# Patient Record
Sex: FEMALE | Race: WHITE | NOT HISPANIC OR LATINO
[De-identification: names, ages, dates, MRNs, and addresses within clinical notes are randomized per-mention and may not be internally consistent; named-entity substitution may affect disease eponyms.]

---

## 2020-01-08 ENCOUNTER — TRANSCRIPTION ENCOUNTER (OUTPATIENT)
Age: 40
End: 2020-01-08

## 2020-01-12 ENCOUNTER — FORM ENCOUNTER (OUTPATIENT)
Age: 40
End: 2020-01-12

## 2020-01-13 ENCOUNTER — NON-APPOINTMENT (OUTPATIENT)
Age: 40
End: 2020-01-13

## 2020-01-13 ENCOUNTER — TRANSCRIPTION ENCOUNTER (OUTPATIENT)
Age: 40
End: 2020-01-13

## 2020-01-13 ENCOUNTER — APPOINTMENT (OUTPATIENT)
Dept: FAMILY MEDICINE | Facility: CLINIC | Age: 40
End: 2020-01-13
Payer: COMMERCIAL

## 2020-01-13 ENCOUNTER — APPOINTMENT (OUTPATIENT)
Dept: ULTRASOUND IMAGING | Facility: CLINIC | Age: 40
End: 2020-01-13
Payer: COMMERCIAL

## 2020-01-13 ENCOUNTER — OUTPATIENT (OUTPATIENT)
Dept: OUTPATIENT SERVICES | Facility: HOSPITAL | Age: 40
LOS: 1 days | End: 2020-01-13
Payer: COMMERCIAL

## 2020-01-13 VITALS
DIASTOLIC BLOOD PRESSURE: 62 MMHG | HEIGHT: 63 IN | HEART RATE: 71 BPM | OXYGEN SATURATION: 99 % | SYSTOLIC BLOOD PRESSURE: 108 MMHG | WEIGHT: 150 LBS | BODY MASS INDEX: 26.58 KG/M2

## 2020-01-13 DIAGNOSIS — R49.0 DYSPHONIA: ICD-10-CM

## 2020-01-13 DIAGNOSIS — Z82.49 FAMILY HISTORY OF ISCHEMIC HEART DISEASE AND OTHER DISEASES OF THE CIRCULATORY SYSTEM: ICD-10-CM

## 2020-01-13 DIAGNOSIS — R00.2 PALPITATIONS: ICD-10-CM

## 2020-01-13 DIAGNOSIS — E66.3 OVERWEIGHT: ICD-10-CM

## 2020-01-13 PROCEDURE — 76536 US EXAM OF HEAD AND NECK: CPT | Mod: 26

## 2020-01-13 PROCEDURE — 93000 ELECTROCARDIOGRAM COMPLETE: CPT | Mod: 59

## 2020-01-13 PROCEDURE — 36415 COLL VENOUS BLD VENIPUNCTURE: CPT

## 2020-01-13 PROCEDURE — 99204 OFFICE O/P NEW MOD 45 MIN: CPT | Mod: 25

## 2020-01-13 PROCEDURE — 76536 US EXAM OF HEAD AND NECK: CPT

## 2020-01-14 PROBLEM — R00.2 PALPITATIONS: Status: ACTIVE | Noted: 2020-01-13

## 2020-01-14 PROBLEM — Z82.49 FAMILY HISTORY OF HYPERTENSION: Status: ACTIVE | Noted: 2020-01-14

## 2020-01-14 PROBLEM — R49.0 DYSPHONIA: Status: ACTIVE | Noted: 2020-01-13

## 2020-01-14 LAB
ALBUMIN SERPL ELPH-MCNC: 4.3 G/DL
ALP BLD-CCNC: 50 U/L
ALT SERPL-CCNC: 12 U/L
ANION GAP SERPL CALC-SCNC: 13 MMOL/L
AST SERPL-CCNC: 18 U/L
BASOPHILS # BLD AUTO: 0.04 K/UL
BASOPHILS NFR BLD AUTO: 0.6 %
BILIRUB SERPL-MCNC: <0.2 MG/DL
BUN SERPL-MCNC: 7 MG/DL
CALCIUM SERPL-MCNC: 9.2 MG/DL
CHLORIDE SERPL-SCNC: 104 MMOL/L
CHOLEST SERPL-MCNC: 186 MG/DL
CHOLEST/HDLC SERPL: 4 RATIO
CO2 SERPL-SCNC: 24 MMOL/L
CREAT SERPL-MCNC: 0.5 MG/DL
EOSINOPHIL # BLD AUTO: 0.08 K/UL
EOSINOPHIL NFR BLD AUTO: 1.2 %
ESTIMATED AVERAGE GLUCOSE: 103 MG/DL
GLUCOSE SERPL-MCNC: 89 MG/DL
HBA1C MFR BLD HPLC: 5.2 %
HCT VFR BLD CALC: 44.9 %
HDLC SERPL-MCNC: 47 MG/DL
HGB BLD-MCNC: 13.6 G/DL
IMM GRANULOCYTES NFR BLD AUTO: 0.1 %
LDLC SERPL CALC-MCNC: 117 MG/DL
LYMPHOCYTES # BLD AUTO: 1.7 K/UL
LYMPHOCYTES NFR BLD AUTO: 24.5 %
MAN DIFF?: NORMAL
MCHC RBC-ENTMCNC: 28.6 PG
MCHC RBC-ENTMCNC: 30.3 GM/DL
MCV RBC AUTO: 94.3 FL
MONOCYTES # BLD AUTO: 0.33 K/UL
MONOCYTES NFR BLD AUTO: 4.8 %
NEUTROPHILS # BLD AUTO: 4.78 K/UL
NEUTROPHILS NFR BLD AUTO: 68.8 %
PLATELET # BLD AUTO: 265 K/UL
POTASSIUM SERPL-SCNC: 4.2 MMOL/L
PROT SERPL-MCNC: 6.9 G/DL
RBC # BLD: 4.76 M/UL
RBC # FLD: 13.1 %
SODIUM SERPL-SCNC: 141 MMOL/L
TRIGL SERPL-MCNC: 111 MG/DL
TSH SERPL-ACNC: 2.24 UIU/ML
WBC # FLD AUTO: 6.94 K/UL

## 2020-01-14 NOTE — PHYSICAL EXAM
[Normal Oropharynx] : the oropharynx was normal [Normal Outer Ear/Nose] : the outer ears and nose were normal in appearance [Supple] : supple [No Lymphadenopathy] : no lymphadenopathy [Normal S1, S2] : normal S1 and S2 [Normal Rate] : normal rate  [No Extremity Clubbing/Cyanosis] : no extremity clubbing/cyanosis [Soft] : abdomen soft [Non Tender] : non-tender [Non-distended] : non-distended [Normal] : no posterior cervical lymphadenopathy and no anterior cervical lymphadenopathy [No Joint Swelling] : no joint swelling [No Rash] : no rash [Coordination Grossly Intact] : coordination grossly intact [Normal Gait] : normal gait [Normal Affect] : the affect was normal [Alert and Oriented x3] : oriented to person, place, and time [Normal Insight/Judgement] : insight and judgment were intact [de-identified] : enlarged thyroid

## 2020-01-23 LAB
A PHAGOCYTOPH IGG TITR SER IF: NORMAL TITER
B BURGDOR AB SER QL IA: NEGATIVE
B MICROTI IGG TITR SER: NORMAL TITER
E CHAFFEENSIS IGG TITR SER IF: NORMAL TITER

## 2020-01-24 ENCOUNTER — TRANSCRIPTION ENCOUNTER (OUTPATIENT)
Age: 40
End: 2020-01-24

## 2020-03-05 ENCOUNTER — RESULT REVIEW (OUTPATIENT)
Age: 40
End: 2020-03-05

## 2020-10-26 ENCOUNTER — APPOINTMENT (OUTPATIENT)
Dept: OBGYN | Facility: CLINIC | Age: 40
End: 2020-10-26
Payer: COMMERCIAL

## 2020-10-26 VITALS
TEMPERATURE: 98.4 F | WEIGHT: 159 LBS | BODY MASS INDEX: 28.17 KG/M2 | SYSTOLIC BLOOD PRESSURE: 112 MMHG | HEIGHT: 63 IN | DIASTOLIC BLOOD PRESSURE: 70 MMHG

## 2020-10-26 DIAGNOSIS — Z82.49 FAMILY HISTORY OF ISCHEMIC HEART DISEASE AND OTHER DISEASES OF THE CIRCULATORY SYSTEM: ICD-10-CM

## 2020-10-26 DIAGNOSIS — Z80.3 FAMILY HISTORY OF MALIGNANT NEOPLASM OF BREAST: ICD-10-CM

## 2020-10-26 DIAGNOSIS — Z78.9 OTHER SPECIFIED HEALTH STATUS: ICD-10-CM

## 2020-10-26 PROCEDURE — 99203 OFFICE O/P NEW LOW 30 MIN: CPT | Mod: 25

## 2020-10-26 PROCEDURE — 99072 ADDL SUPL MATRL&STAF TM PHE: CPT

## 2020-10-27 PROBLEM — Z82.49 FAMILY HISTORY OF HYPERTENSION: Status: ACTIVE | Noted: 2020-10-26

## 2020-10-27 PROBLEM — Z78.9 PATIENT DENIES MEDICAL PROBLEMS: Status: RESOLVED | Noted: 2020-10-27 | Resolved: 2020-10-27

## 2020-10-27 PROBLEM — Z80.3 FAMILY HISTORY OF MALIGNANT NEOPLASM OF BREAST: Status: ACTIVE | Noted: 2020-10-26

## 2020-10-27 LAB
C TRACH RRNA SPEC QL NAA+PROBE: NOT DETECTED
HPV HIGH+LOW RISK DNA PNL CVX: NOT DETECTED
N GONORRHOEA RRNA SPEC QL NAA+PROBE: NOT DETECTED
SOURCE TP AMPLIFICATION: NORMAL

## 2020-10-27 NOTE — HISTORY OF PRESENT ILLNESS
[Patient reported mammogram was normal] : Patient reported mammogram was normal [Patient reported PAP Smear was normal] : Patient reported PAP Smear was normal [HIV test declined] : HIV test declined [Syphilis test declined] : Syphilis test declined [Hepatitis B test declined] : Hepatitis B test declined [Hepatitis C test declined] : Hepatitis C test declined [Mammogramdate] : 2020 [PapSmeardate] : 2018 [LMPDate] : 2018 [PGHxTotal] : 3 [[facility] : 2 [PG[facility]Premature] : 1 [[facility] [FreeTextEntry1] : 2009: , C/S, M 5lb. 2011: FT, C/S, M, 7lb. 2014: FT, C/S, F, 8lb. Denies hx of cysts, fibroids, abnormla pap, STI

## 2020-10-27 NOTE — HISTORY OF PRESENT ILLNESS
[Patient reported mammogram was normal] : Patient reported mammogram was normal [Patient reported PAP Smear was normal] : Patient reported PAP Smear was normal [HIV test declined] : HIV test declined [Syphilis test declined] : Syphilis test declined [Hepatitis B test declined] : Hepatitis B test declined [Hepatitis C test declined] : Hepatitis C test declined [Mammogramdate] : 2020 [PapSmeardate] : 2018 [LMPDate] : 2018 [PGHxTotal] : 3 [[facility]] : 2 [PGxPremature] : 1 [[facility]] : 3 [FreeTextEntry1] : 2009: , C/S, M 5lb. 2011: FT, C/S, M, 7lb. 2014: FT, C/S, F, 8lb. Denies hx of cysts, fibroids, abnormla pap, STI

## 2020-10-27 NOTE — PHYSICAL EXAM
[Appropriately responsive] : appropriately responsive [Alert] : alert [No Acute Distress] : no acute distress [No Lymphadenopathy] : no lymphadenopathy [Regular Rate Rhythm] : regular rate rhythm [No Murmurs] : no murmurs [Clear to Auscultation B/L] : clear to auscultation bilaterally [Soft] : soft [Non-distended] : non-distended [No HSM] : No HSM [No Lesions] : no lesions [No Mass] : no mass [Oriented x3] : oriented x3 [Examination Of The Breasts] : a normal appearance [No Discharge] : no discharge [No Masses] : no breast masses were palpable [Labia Majora] : normal [Labia Minora] : normal [Normal] : normal [Uterine Adnexae] : normal [FreeTextEntry7] : mildly tender on deep palpation RLQ, no rebound/guardind [FreeTextEntry5] : Pap collected, cervix present, no lesions [FreeTextEntry6] : uterus absent. Palpable right adnexal fullness, nonpalpable left adnexa. Mild right adnexal tenderness on deep palpation

## 2020-10-27 NOTE — DISCUSSION/SUMMARY
[FreeTextEntry1] : 39 yo with RLQ pain and found to have bilateral cysts. \par 1) Bilateral cysts: unclear right adnexal cyst is ovarian vs paratubal vs peritoneal inclusion cyst. Recommend MRI for further evaluation\par \par Discussed NSAIDs prn\par \par 2) Health maintenance: \par Pap + HPV\par GC/CT\par Declines STI testing \par \par Return after MRI to discuss results and management plan

## 2020-10-27 NOTE — DISCUSSION/SUMMARY
[FreeTextEntry1] : 41 yo with RLQ pain and found to have bilateral cysts. \par 1) Bilateral cysts: unclear right adnexal cyst is ovarian vs paratubal vs peritoneal inclusion cyst. Recommend MRI for further evaluation\par \par Discussed NSAIDs prn\par \par 2) Health maintenance: \par Pap + HPV\par GC/CT\par Declines STI testing \par \par Return after MRI to discuss results and management plan

## 2020-11-03 ENCOUNTER — APPOINTMENT (OUTPATIENT)
Dept: MRI IMAGING | Facility: CLINIC | Age: 40
End: 2020-11-03
Payer: COMMERCIAL

## 2020-11-03 ENCOUNTER — OUTPATIENT (OUTPATIENT)
Dept: OUTPATIENT SERVICES | Facility: HOSPITAL | Age: 40
LOS: 1 days | End: 2020-11-03
Payer: COMMERCIAL

## 2020-11-03 DIAGNOSIS — Z00.8 ENCOUNTER FOR OTHER GENERAL EXAMINATION: ICD-10-CM

## 2020-11-03 DIAGNOSIS — N83.291 OTHER OVARIAN CYST, RIGHT SIDE: ICD-10-CM

## 2020-11-03 LAB — CYTOLOGY CVX/VAG DOC THIN PREP: NORMAL

## 2020-11-03 PROCEDURE — 72197 MRI PELVIS W/O & W/DYE: CPT

## 2020-11-03 PROCEDURE — 72197 MRI PELVIS W/O & W/DYE: CPT | Mod: 26

## 2020-11-03 PROCEDURE — A9585: CPT

## 2020-11-18 ENCOUNTER — APPOINTMENT (OUTPATIENT)
Dept: OBGYN | Facility: CLINIC | Age: 40
End: 2020-11-18
Payer: COMMERCIAL

## 2020-11-18 VITALS
SYSTOLIC BLOOD PRESSURE: 122 MMHG | BODY MASS INDEX: 28.59 KG/M2 | DIASTOLIC BLOOD PRESSURE: 74 MMHG | WEIGHT: 161.38 LBS | HEIGHT: 63 IN | TEMPERATURE: 98.5 F

## 2020-11-18 PROCEDURE — 99213 OFFICE O/P EST LOW 20 MIN: CPT | Mod: 25

## 2020-11-20 NOTE — HISTORY OF PRESENT ILLNESS
[FreeTextEntry1] : Pt here today for follow-up of MRI results and review of previous operative report. She reports pain has gradually improved but still occasionally present but not sharp pain that initially brought her to ED. Her pain completely resolves with Tylenol. \par \par MRI 11/3/20: \par 1.7cm simple appearing right ovarian cystic lesion, adjacent to right ovary is a tubular cystic lesion measuring 1.8cm without internal septation or enhancing mural nodule, no pelvic lymphadenopathy. Recommend continued ultrasound surveillance with repeat study in 3 months for stability or resolution.\par Addendum: tubular right adnexal cystic lesion measures 4.2 cm in longest dimension. \par \par Operative report reviewed from surgery 2018: Procedure: laparoscopic supracervical hysterectomy, bilateral salpingectomy, lysis of bowel adhesions. Notable findings of omental and bowel adhesions to anterior abdominal wall, uterus densely adherent to anterior abdominal wall and cervix unable to be visualized until lysis of adhesions was performed.

## 2020-11-20 NOTE — PLAN
[FreeTextEntry1] : 41 yo with right ovarian cyst and additional cystic structure likely peritoneal inclusion cyst. \par -Given her continued improvement, I recommend expectant management at this time. We discussed her severe adhesive disease and risks of surgery that outweight the benefits at this point since she is improving. Discussed that this additional cyst appears separate from ovary and is likely a peritoneal inclusion cyst. Unlikely to be hydrosalpinx given her tubes were removed\par -Return in 2-3 months for repeat sonogram in office \par -Tylenol or NSAIDs prn pain, given instructions to call office or go to ED if pain is severe\par -If her symptoms worsen, consider surgical intervention

## 2021-01-20 ENCOUNTER — APPOINTMENT (OUTPATIENT)
Dept: OBGYN | Facility: CLINIC | Age: 41
End: 2021-01-20
Payer: COMMERCIAL

## 2021-01-20 VITALS
BODY MASS INDEX: 28.9 KG/M2 | WEIGHT: 163.13 LBS | TEMPERATURE: 97.2 F | SYSTOLIC BLOOD PRESSURE: 118 MMHG | HEIGHT: 63 IN | DIASTOLIC BLOOD PRESSURE: 70 MMHG

## 2021-01-20 PROCEDURE — 76830 TRANSVAGINAL US NON-OB: CPT

## 2021-01-20 PROCEDURE — 99072 ADDL SUPL MATRL&STAF TM PHE: CPT

## 2021-01-20 PROCEDURE — 99213 OFFICE O/P EST LOW 20 MIN: CPT | Mod: 25

## 2021-01-20 NOTE — PHYSICAL EXAM
[Examination Of The Breasts] : a normal appearance [Normal] : normal [No Discharge] : no discharge [No Masses] : no breast masses were palpable

## 2021-01-20 NOTE — DISCUSSION/SUMMARY
[FreeTextEntry1] : 39 yo with bilateral lower pelvic pain R>L with persistent right adnexal cyst and left ovarian cyst. Today pt with reported left galactorrhea, no masses palpated. \par 1) Pelvic pain: \par Discussed options for management including Tylenol/NSAIDs prn, hormonal management, or surgical intervention. Discussed she is a candidate for minimally invasive approach, recommend robotic approach. Discussed risk of laparotomy given prior surgery indicated significant adhesions. She wants to review her options with her  before making a decision. \par \par 2) Galactorrhea: not produced on exam today, no masses palpated\par Mammogram and ultrasound ordered\par Consider prolactin at last visit ( may be falsely elevated today given breast exam)\par \par Return 1-2 weeks for followup

## 2021-01-20 NOTE — HISTORY OF PRESENT ILLNESS
[FreeTextEntry1] : 41 yo with hx of RLQ pain and adnexal cyst returns for follow-up today with repeat ultrasound. Pt reports continued intermittent RLQ pain, occasionally unable to walk due to pain. Occasional LLQ pain. \par \par States she recently noticed milky discharge from left breast in shower during expression. Denies any palpable masses. She has a screening mammogram scheduled for March. Also reports headache x last 3 days, improving with Tylenol. Denies visual changes.

## 2021-01-20 NOTE — PROCEDURE
[Pelvic Pain] : pelvic pain [F/U Abnormal US] : f/u abnormal ultrasound [Transvaginal Ultrasound] : transvaginal ultrasound [Absent] : absent [FreeTextEntry5] : Cervix present measuring 4.1x4.2cm, no free fluid [FreeTextEntry7] : Right ovary not visualized, in the area of the right adnexa a tubular structure is noted measuring 4.4x2.1cm hypoechoic [FreeTextEntry8] : 3.5x2.4cm with simple LOV cyst measuring 2x2cm  [FreeTextEntry4] : Persistent right adnexal cyst: hydrosalpinx vs ovarian cyst vs peritoneal inclusion cyst\par Simple left ovarian cyst

## 2021-02-01 ENCOUNTER — APPOINTMENT (OUTPATIENT)
Dept: OBGYN | Facility: CLINIC | Age: 41
End: 2021-02-01
Payer: COMMERCIAL

## 2021-02-01 PROCEDURE — 99214 OFFICE O/P EST MOD 30 MIN: CPT | Mod: 95

## 2021-02-01 NOTE — DISCUSSION/SUMMARY
[FreeTextEntry1] : 39 yo with pelvic pain R>L and bilateral adnexal cysts. \par -Options for management dicsussed, pt would like to try OCP. No contraindications, instructed on use. Rx sent to pharmacy\par -Umbilical hernia: currently asymptomatic, will need referral to general surgeon at next visit\par -Breast galactorrhea: await imaging reports, currently resolved. consider PRL at next visit\par -Return in 3 months for follow-up and repeat ultrasound\par

## 2021-02-01 NOTE — HISTORY OF PRESENT ILLNESS
[FreeTextEntry1] : 39 yo here for televist today as follow-up to her symptoms. No change in symptoms of abdominal pain since last visit. States breast discharge has resolved. Had mammogram/ultrasound done which she states was normal, report not yet available. \par \par We reviewed her imaging findings of bilateral adnexal cysts. Discussed right adnexal cyst with possible etiology of ovarian cyst, hydrosalpinx (however prior op report with bilateral salpingectomy confirmed on pathology), peritoneal inclusion cyst. Left adnexal cyst appears to be associated with the left ovary. Discussed options of NSAIDs/Tylenol prn, hormonal management, surgical intervention. Pt would like to try hormonal management to assess for improvement prior to scheduling surgery in the summer as she cannot take time from work at this point. We reviewed how OCP work to prevent ovulation and the growth of follicular cysts. We reviewed that it may not change her current cysts and that she may ultimately need surgery. Again discussed risks of surgery and risk of laparotomy with prior surgery that indicated significant adhesions but that she is a candidate for robotic approach. \par \par We discussed that she also has a small umbilical hernia on imaging and will need a referral to surgeon for evaluation. She would like to wait for this as she does not have pain in her umbilicus often and due to her work schedule.

## 2021-02-01 NOTE — REASON FOR VISIT
[Home] : at home, [unfilled] , at the time of the visit. [Other Location: e.g. Home (Enter Location, City,State)___] : at [unfilled] [Verbal consent obtained from patient] : the patient, [unfilled] [Follow-Up] : a follow-up evaluation of [FreeTextEntry2] : RLQ pain

## 2021-05-03 ENCOUNTER — APPOINTMENT (OUTPATIENT)
Dept: OBGYN | Facility: CLINIC | Age: 41
End: 2021-05-03
Payer: COMMERCIAL

## 2021-05-03 VITALS
WEIGHT: 162 LBS | BODY MASS INDEX: 28.7 KG/M2 | DIASTOLIC BLOOD PRESSURE: 62 MMHG | HEIGHT: 63 IN | SYSTOLIC BLOOD PRESSURE: 128 MMHG

## 2021-05-03 PROCEDURE — 99213 OFFICE O/P EST LOW 20 MIN: CPT | Mod: 25

## 2021-05-03 PROCEDURE — 76830 TRANSVAGINAL US NON-OB: CPT

## 2021-05-03 PROCEDURE — 99072 ADDL SUPL MATRL&STAF TM PHE: CPT

## 2021-05-03 NOTE — PROCEDURE
[Pelvic Pain] : pelvic pain [Absent] : absent [FreeTextEntry9] : follow-up cyst [FreeTextEntry7] : 3.0x1.6cm with ROV simple cyst 1.4x1.4cm,  paraovarian cystic structure 3.5x2.0 with single thin septation [FreeTextEntry8] : 2.3x1.3, no cysts visualized [FreeTextEntry4] : Small simple ROV cyst, paraovarian cystic structure slightly smaller than previous\par Left ovarian cyst resolved

## 2021-05-03 NOTE — HISTORY OF PRESENT ILLNESS
[FreeTextEntry1] : Pt here for follow-up. She tried OCP x 3 weeks but self-discontinued given persistent nausea that was not resolving. She states pain has been improving in frequency and severity. States she notices some discomfort weekly that is dull, resolves spontaneously or with 500mg Tylenol. Denies any other symptoms. \par \par States intermittent discomfort in umbilical area, hx of umbilical hernia.

## 2021-05-03 NOTE — DISCUSSION/SUMMARY
[FreeTextEntry1] : 39 yo with pelvic pain improving and right paraovarian cyst decreasing in size, left ovarian cyst resolved, new small simple ROV cyst. \par -Findings discussed with pt. Discussed ovarian cysts appear functional and may continue to intermittently come and go and do not need follow-up as long as her pain continues to improve.\par -Paraovarian cyst may be remnant hydrosalpinx vs paraovarian cyst vs peritoneal inclusion cyst. Given it is decreasing in size and pain is improving, recommend continued expectant management\par -Referral given for consultation for umbilical hernia

## 2022-02-28 ENCOUNTER — APPOINTMENT (OUTPATIENT)
Dept: OBGYN | Facility: CLINIC | Age: 42
End: 2022-02-28
Payer: COMMERCIAL

## 2022-02-28 VITALS
BODY MASS INDEX: 27.82 KG/M2 | SYSTOLIC BLOOD PRESSURE: 120 MMHG | WEIGHT: 157 LBS | HEIGHT: 63 IN | TEMPERATURE: 97.8 F | DIASTOLIC BLOOD PRESSURE: 60 MMHG

## 2022-02-28 PROCEDURE — 99213 OFFICE O/P EST LOW 20 MIN: CPT

## 2022-02-28 NOTE — PHYSICAL EXAM
[Chaperone Present] : A chaperone was present in the examining room during all aspects of the physical examination [FreeTextEntry1] : FLORIAN Sinha  [Appropriately responsive] : appropriately responsive [Alert] : alert [No Acute Distress] : no acute distress [No Lymphadenopathy] : no lymphadenopathy [Regular Rate Rhythm] : regular rate rhythm [No Murmurs] : no murmurs [Clear to Auscultation B/L] : clear to auscultation bilaterally [Soft] : soft [Non-distended] : non-distended [No HSM] : No HSM [No Lesions] : no lesions [No Mass] : no mass [Oriented x3] : oriented x3 [FreeTextEntry7] : mildly tender on deep palpation RLQ, no rebound/guarding [Labia Majora] : normal [Labia Minora] : normal [Normal] : normal [Uterine Adnexae] : normal [FreeTextEntry5] : cervix present, no lesions [FreeTextEntry6] : uterus absent. Palpable right adnexal fullness, nonpalpable left adnexa. Mild right adnexal tenderness on deep palpation

## 2022-02-28 NOTE — HISTORY OF PRESENT ILLNESS
[FreeTextEntry1] : Pt here for follow-up. She continues to have pelvic pressure and intermittent pain. She takes 1000mg Tylenol about once per month. No other complaints. \par \par Prior MRI 11/2020 with 1.7cm ROV cyst, tubular cystic structure right anexa 4.2x1.8cm.

## 2022-02-28 NOTE — DISCUSSION/SUMMARY
[FreeTextEntry1] : 40 yo with pelvic pain improving and prior right paraovarian cyst decreasing in size and new small simple ROV cyst. \par -Repeat pelvic ultrasound for stability\par -Continue NSAID or Tylenol prn\par -Pt wishing to avoid surgical intervention\par -Return after ultrasound to review results

## 2022-03-14 ENCOUNTER — APPOINTMENT (OUTPATIENT)
Dept: OBGYN | Facility: CLINIC | Age: 42
End: 2022-03-14
Payer: COMMERCIAL

## 2022-03-14 VITALS
BODY MASS INDEX: 27.82 KG/M2 | WEIGHT: 157 LBS | HEIGHT: 63 IN | DIASTOLIC BLOOD PRESSURE: 62 MMHG | TEMPERATURE: 97.6 F | SYSTOLIC BLOOD PRESSURE: 110 MMHG

## 2022-03-14 DIAGNOSIS — K42.9 UMBILICAL HERNIA W/OUT OBSTRUCTION OR GANGRENE: ICD-10-CM

## 2022-03-14 PROCEDURE — 99213 OFFICE O/P EST LOW 20 MIN: CPT

## 2022-03-14 NOTE — DISCUSSION/SUMMARY
[FreeTextEntry1] : 40 yo with pelvic pain improving and prior right paraovarian cyst stable in size. \par -Continue NSAID or Tylenol prn\par -Pt wishing to avoid surgical intervention at this time\par -Referral to Surgery for umbilical hernia\par -Follow up in 6 months

## 2022-03-14 NOTE — HISTORY OF PRESENT ILLNESS
[FreeTextEntry1] : Pt here for follow-up. \par \par Pelvic ultrasound with ovoid cystic focus ROV 4.0x1.7x1.6cm, LOV with small follicle seen measuring up to 1.7cm. \par \par Findings reviewed, discussed stable size and no changes. \par \par She continues to have intermittent pain, gets relief with Tylenol. \par \par Discussed options of continued pain management vs surgical intervention. She would like to continue with pain management as needed. \par \par Plan for referral to surgery for umbilical hernia on MRI. Discussed that she should have this address during her surgery if she proceeds with surgery in the future.

## 2022-04-14 ENCOUNTER — RESULT REVIEW (OUTPATIENT)
Age: 42
End: 2022-04-14

## 2022-04-21 ENCOUNTER — APPOINTMENT (OUTPATIENT)
Dept: BREAST CENTER | Facility: CLINIC | Age: 42
End: 2022-04-21
Payer: COMMERCIAL

## 2022-04-21 VITALS
HEART RATE: 87 BPM | WEIGHT: 160 LBS | HEIGHT: 63 IN | SYSTOLIC BLOOD PRESSURE: 145 MMHG | DIASTOLIC BLOOD PRESSURE: 81 MMHG | BODY MASS INDEX: 28.35 KG/M2 | TEMPERATURE: 97.4 F | OXYGEN SATURATION: 98 %

## 2022-04-21 DIAGNOSIS — N63.20 UNSPECIFIED LUMP IN THE LEFT BREAST, UNSPECIFIED QUADRANT: ICD-10-CM

## 2022-04-21 DIAGNOSIS — N64.52 NIPPLE DISCHARGE: ICD-10-CM

## 2022-04-21 PROCEDURE — 99204 OFFICE O/P NEW MOD 45 MIN: CPT

## 2022-04-22 PROBLEM — N63.20 BREAST MASS, LEFT: Status: ACTIVE | Noted: 2022-04-22

## 2022-04-22 PROBLEM — N64.52 NIPPLE DISCHARGE: Status: ACTIVE | Noted: 2022-04-22

## 2022-04-22 NOTE — HISTORY OF PRESENT ILLNESS
[FreeTextEntry1] : I had the pleasure of seeing KIRSTIN PERSAUD  in the office today for a new breast evaluation.\par \par Kirstin presents today for possible surgical consultation due to increase in size (1.2 cm --> 1.6 cm) of benign and concordant mass from 2020.  She understands she does not need to undergo surgery at this time and will continue to monitor.  \par \par She denies dominant breast mass, skin changes or nipple discharge. She denies headaches, blurry vision, chest pain, SOB, abdominal pain, joint aches or difficult walking.\par \par  with 1st delivery at 29.  Menses began at age 12.\par She denies personal history of malignancy.\par Family history is significant for paternal aunt diagnosed with breast cancer at age 50.\par \par Mar 5 2020 PROCEDURE: WOO 0101 _ LT NEEDLE BX ULT [id number] PRO\par FULL RESULT:   ******** ADDENDUM #1 ********\par Pathology from needle biopsy is as follows:\par 1.)Left breast 12-1:00 axis: Benign breast parenchyma with adenosis, embedded in a hyalinized and sclerotic fibrotic stroma. 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 Recommend follow up bilateral mammogram and sonogram in one year, unless clinically indicated sooner. We will telephone her with these results and recommendations.\par \par 2022  Mammogram/sonogram\par Impression: No suspicious mammographic findings. Heterogeneously dense breast tissue, for which correlation with breast ultrasound is recommended. Please refer to today's bilateral breast ultrasound demonstrating interval enlargement in previously biopsied mass in the retroareolar left breast and a new 1.1 cm mass in the left breast 9:00 axis, for which ultrasound-guided core biopsy is recommended. Additionally, given the interval enlargement in previously biopsied benign mass retroareolar left breast, breast surgical consultation recommended.\par The patient is not yet aware of these findings. We can perform the core biopsy at our facility. We will contact her and assist her with obtaining the necessary follow-up appointments.\par BI-RADS 4: Suspicious Dense\par \par 2022 WOO 0101 _ LT NEEDLE BX ULT [id number] PRO\par FULL RESULT: ******** ADDENDUM #1 ********\par Pathology from the left breast mass 9:00 position demonstrated fibrocystic change with focal sclerosing adenosis. Findings concordant with imaging features. Six-month left breast ultrasonographic follow recommended.\par Findings and recommendations be communicated by telephone.\par \par Kirstin presents today for possible surgical consultation due to increase in size (1.2 cm --> 1.6 cm) of benign and concordant mass from 2020.  She understands she does not need to undergo surgery at this time and will continue to monitor.  She was so happy that she does not need to undergo surgery at this time and began crying due to the multiple surgeries she has had in the patients.  We also reviewed her most recent biopsy from 2022 and she understands results are benign and concordant with recommendation of US in 6 months.  She will follow up afterwards to discuss.  She also reports one episode of left nipple yellow discharge and will be sent for MR breast. She will return to the office in 6 months if imaging is stable and benign.\par \par She understands and agrees with plan.  All questions answered.\par

## 2022-04-22 NOTE — ASSESSMENT
[FreeTextEntry1] : Kirstin presents today for possible surgical consultation due to increase in size (1.2 cm --> 1.6 cm) of benign and concordant mass from March 2020.  She understands she does not need to undergo surgery at this time and will continue to monitor.  She was so happy that she does not need to undergo surgery at this time and began crying due to the multiple surgeries she has had in the patients.  We also reviewed her most recent biopsy from 4/14/2022 and she understands results are benign and concordant with recommendation of US in 6 months.  She will follow up afterwards to discuss.\par 1.  follow up in 6 months\par 2.  Left breast US due in October 2022\par 3.  MR breast- 1 episode of left yellow nipple discharge

## 2022-04-22 NOTE — PHYSICAL EXAM
[Normocephalic] : normocephalic [Atraumatic] : atraumatic [Supple] : supple [No Supraclavicular Adenopathy] : no supraclavicular adenopathy [Examined in the supine and seated position] : examined in the supine and seated position [Bra Size: ___] : Bra Size: [unfilled] [No dominant masses] : no dominant masses in right breast  [No dominant masses] : no dominant masses left breast [No Nipple Retraction] : no left nipple retraction [No Nipple Discharge] : no left nipple discharge [Breast Nipple Inversion] : nipples not inverted [Breast Nipple Retraction] : nipples not retracted [Breast Nipple Flattening] : nipples not flattened [Breast Nipple Fissures] : nipples not fissured [Breast Abnormal Lactation (Galactorrhea)] : no galactorrhea [Breast Abnormal Secretion Bloody Fluid] : no bloody discharge [Breast Abnormal Secretion Serous Fluid] : no serous discharge [Breast Abnormal Secretion Opalescent Fluid] : no milky discharge [No Axillary Lymphadenopathy] : no left axillary lymphadenopathy [No Edema] : no edema [No Rashes] : no rashes [No Ulceration] : no ulceration [de-identified] : No supraclavicular or axillary adenopathy. No dominant masses, normal to palpation. Everted nipple without discharge. No skin changes.\par \par  [de-identified] : No supraclavicular or axillary adenopathy. No dominant masses, normal to palpation. Everted nipple without discharge. No skin changes.\par \par

## 2022-09-13 ENCOUNTER — APPOINTMENT (OUTPATIENT)
Dept: OBGYN | Facility: CLINIC | Age: 42
End: 2022-09-13

## 2022-10-03 ENCOUNTER — APPOINTMENT (OUTPATIENT)
Dept: OBGYN | Facility: CLINIC | Age: 42
End: 2022-10-03

## 2022-10-14 ENCOUNTER — RESULT REVIEW (OUTPATIENT)
Age: 42
End: 2022-10-14

## 2022-11-03 ENCOUNTER — APPOINTMENT (OUTPATIENT)
Dept: UROLOGY | Facility: CLINIC | Age: 42
End: 2022-11-03

## 2022-11-03 VITALS
SYSTOLIC BLOOD PRESSURE: 148 MMHG | HEART RATE: 71 BPM | WEIGHT: 157 LBS | HEIGHT: 63 IN | DIASTOLIC BLOOD PRESSURE: 88 MMHG | BODY MASS INDEX: 27.82 KG/M2

## 2022-11-03 DIAGNOSIS — N39.0 URINARY TRACT INFECTION, SITE NOT SPECIFIED: ICD-10-CM

## 2022-11-03 PROCEDURE — 99204 OFFICE O/P NEW MOD 45 MIN: CPT

## 2022-11-03 NOTE — PHYSICAL EXAM
[General Appearance - Well Developed] : well developed [General Appearance - Well Nourished] : well nourished [Normal Appearance] : normal appearance [Well Groomed] : well groomed [General Appearance - In No Acute Distress] : no acute distress [Edema] : no peripheral edema [Respiration, Rhythm And Depth] : normal respiratory rhythm and effort [Exaggerated Use Of Accessory Muscles For Inspiration] : no accessory muscle use [Abdomen Soft] : soft [Abdomen Tenderness] : non-tender [Normal Station and Gait] : the gait and station were normal for the patient's age [] : no rash [No Focal Deficits] : no focal deficits [Oriented To Time, Place, And Person] : oriented to person, place, and time [Affect] : the affect was normal [Mood] : the mood was normal [Not Anxious] : not anxious [FreeTextEntry1] : +left cva tenderness

## 2022-11-03 NOTE — HISTORY OF PRESENT ILLNESS
[FreeTextEntry1] : 41yo F presents after hospital f/u\par She went to ED few days ago with lower left abdominal pain and dysuria. Diagnosed with UTI and currently on cefdinir x 2 week course for ascending uti. \par CT performed showed incidental right renal mass ~2.8 x 2.3 cm concerning for renal cell carcinoma vs angiomyolipoma. \par She still has slight lower abdominal pain. Dysuria resolved\par \par no smoking hx

## 2022-11-03 NOTE — ASSESSMENT
[FreeTextEntry1] : Renal mass:\par will obtain MRI renal mass protocol and f/u\par CT images personally reviewed with patient and answered all questions \par \par Ascending UTI:\par c/w 2 week course of cefdinir \par f/u in 2 weeks recheck urine

## 2022-11-10 ENCOUNTER — APPOINTMENT (OUTPATIENT)
Dept: MRI IMAGING | Facility: CLINIC | Age: 42
End: 2022-11-10

## 2022-11-10 ENCOUNTER — OUTPATIENT (OUTPATIENT)
Dept: OUTPATIENT SERVICES | Facility: HOSPITAL | Age: 42
LOS: 1 days | End: 2022-11-10
Payer: COMMERCIAL

## 2022-11-10 DIAGNOSIS — N28.89 OTHER SPECIFIED DISORDERS OF KIDNEY AND URETER: ICD-10-CM

## 2022-11-10 PROCEDURE — A9585: CPT

## 2022-11-10 PROCEDURE — 74183 MRI ABD W/O CNTR FLWD CNTR: CPT | Mod: 26

## 2022-11-10 PROCEDURE — 74183 MRI ABD W/O CNTR FLWD CNTR: CPT

## 2022-11-16 ENCOUNTER — APPOINTMENT (OUTPATIENT)
Dept: OBGYN | Facility: CLINIC | Age: 42
End: 2022-11-16

## 2022-11-16 VITALS
SYSTOLIC BLOOD PRESSURE: 110 MMHG | WEIGHT: 156 LBS | DIASTOLIC BLOOD PRESSURE: 62 MMHG | BODY MASS INDEX: 27.64 KG/M2 | RESPIRATION RATE: 16 BRPM | HEIGHT: 63 IN

## 2022-11-16 PROCEDURE — 99213 OFFICE O/P EST LOW 20 MIN: CPT

## 2022-11-16 NOTE — PHYSICAL EXAM
Please let patient know CT scan report of abdomen pelvis is negative for acute abnormality.   Kamaljit Burrell MD [Labia Majora] : normal [Labia Minora] : normal [Normal] : normal [Uterine Adnexae] : normal [FreeTextEntry5] : cervix present, no lesions [FreeTextEntry6] : uterus absent. Palpable right adnexal fullness, nonpalpable left adnexa. Mild right adnexal tenderness on deep palpation

## 2022-11-16 NOTE — HISTORY OF PRESENT ILLNESS
[FreeTextEntry1] : Pt here for follow-up. REcently went to ED with LLQ pain and dysuria, found to have UTI, given cefdinir x 2 wks, had CT with right renal mass 2.8x2.3cm RCC vs angiolipoma. Hydrosalpinx seen, stable. States pain improving in LLQ. \par \par Last ultrasound in March with ovoid cystic focus ROV 4.0x1.7x1.6cm, LOV with small follicle seen measuring up to 1.7cm. \par \par Findings reviewed, discussed stable size and no changes. \par \par RLQ pain has improved, minimal, takes tylenol with relief as needed. \par \par Did not see surgeon for umbilical hernia\par \par \par

## 2022-11-16 NOTE — CONSULT LETTER
[Dear  ___] : Dear  [unfilled], [FreeTextEntry1] : I had the pleasure of evaluating your patient, DRE PERSAUD. Please see my note enclosed for full details.\par \par Thank you for allowing me to participate in the care of this patient. If you have any questions, please do not hesitate to contact me.\par \par Sincerely,\par \par Cherelle Montano MD, FACOG \par [facility]\par Obstetrics and Gynecology in [place]\par [address] [place] [address], [facility] [address]\[place] [address]\[facility] Phone: [phone] Fax: [phone]

## 2022-11-16 NOTE — DISCUSSION/SUMMARY
[FreeTextEntry1] : 41 yo with pelvic pain improving and prior right paraovarian cyst stable in size. \par -Continue NSAID or Tylenol prn\par -Continue expectant management. Given her significant prior surgeries and stabla paraovariancyst with minimal symptoms ,she does not require surgical intervention at this time\par -Referral to Surgery for umbilical hernia\par -Follow up in 2 months for annual \par \par

## 2022-11-17 ENCOUNTER — APPOINTMENT (OUTPATIENT)
Dept: UROLOGY | Facility: CLINIC | Age: 42
End: 2022-11-17

## 2022-11-17 VITALS
SYSTOLIC BLOOD PRESSURE: 162 MMHG | HEIGHT: 63 IN | BODY MASS INDEX: 27.64 KG/M2 | RESPIRATION RATE: 14 BRPM | WEIGHT: 156 LBS | TEMPERATURE: 98 F | HEART RATE: 80 BPM | DIASTOLIC BLOOD PRESSURE: 89 MMHG

## 2022-11-17 DIAGNOSIS — N39.0 URINARY TRACT INFECTION, SITE NOT SPECIFIED: ICD-10-CM

## 2022-11-17 PROCEDURE — 99214 OFFICE O/P EST MOD 30 MIN: CPT

## 2022-11-17 NOTE — HISTORY OF PRESENT ILLNESS
[FreeTextEntry1] : 41yo F presents for f/u Right renal mass\par MRI showed ~3cm right interpolar renal mass suspicious for renal cell carcinoma. It increased from 1.4cm in 10/2020\par She denies any flank pain or hematuria. \par \par MRI images personally reviewed with patient. I also spoke with patient's sister who is a doctor. \par no smoking hx

## 2022-12-05 ENCOUNTER — APPOINTMENT (OUTPATIENT)
Dept: UROLOGY | Facility: CLINIC | Age: 42
End: 2022-12-05

## 2022-12-05 VITALS
HEIGHT: 63 IN | OXYGEN SATURATION: 100 % | SYSTOLIC BLOOD PRESSURE: 151 MMHG | HEART RATE: 61 BPM | BODY MASS INDEX: 27.29 KG/M2 | DIASTOLIC BLOOD PRESSURE: 92 MMHG | WEIGHT: 154 LBS

## 2022-12-05 PROCEDURE — 99214 OFFICE O/P EST MOD 30 MIN: CPT

## 2022-12-07 NOTE — HISTORY OF PRESENT ILLNESS
[FreeTextEntry1] : patient recently had incidental 3 cm centrally located renal mass diagnosed\par here for treatment planning\par No hematuria\par No infection

## 2022-12-07 NOTE — ASSESSMENT
[FreeTextEntry1] : I personally reviewed the images before and during the visit\par \par We discussed the natural history of solid renal masses.   Options of partial nephrectomy (robotic vs laparoscopic vs open), surveillance and ablative treatment was discussed in detail.    We discussed the R/B/A and complications of all options.   Robotic partial nephrectomy was discussed in detail.   We discussed the possibility of open conversion or laparoscopic conversion.   We also discussed the possibility of radical nephrectomy.   Intraoperative hemorrhage vs delayed bleeding and urinary leakage was discussed in detail.   All questions were answered as well as instructions were given.   \par Centrally located lesion and a radical nephrectomy might be necessary.\par We discussed the possibility of oncocytoma\par Consent signed

## 2022-12-08 ENCOUNTER — RESULT REVIEW (OUTPATIENT)
Age: 42
End: 2022-12-08

## 2022-12-08 ENCOUNTER — OUTPATIENT (OUTPATIENT)
Dept: OUTPATIENT SERVICES | Facility: HOSPITAL | Age: 42
LOS: 1 days | End: 2022-12-08
Payer: COMMERCIAL

## 2022-12-08 VITALS
SYSTOLIC BLOOD PRESSURE: 138 MMHG | RESPIRATION RATE: 16 BRPM | OXYGEN SATURATION: 100 % | HEART RATE: 56 BPM | WEIGHT: 147.71 LBS | DIASTOLIC BLOOD PRESSURE: 84 MMHG | TEMPERATURE: 97 F | HEIGHT: 63 IN

## 2022-12-08 DIAGNOSIS — N28.89 OTHER SPECIFIED DISORDERS OF KIDNEY AND URETER: ICD-10-CM

## 2022-12-08 DIAGNOSIS — Z01.818 ENCOUNTER FOR OTHER PREPROCEDURAL EXAMINATION: ICD-10-CM

## 2022-12-08 DIAGNOSIS — Z98.891 HISTORY OF UTERINE SCAR FROM PREVIOUS SURGERY: Chronic | ICD-10-CM

## 2022-12-08 DIAGNOSIS — Z98.890 OTHER SPECIFIED POSTPROCEDURAL STATES: Chronic | ICD-10-CM

## 2022-12-08 DIAGNOSIS — Z29.9 ENCOUNTER FOR PROPHYLACTIC MEASURES, UNSPECIFIED: ICD-10-CM

## 2022-12-08 DIAGNOSIS — Z90.710 ACQUIRED ABSENCE OF BOTH CERVIX AND UTERUS: Chronic | ICD-10-CM

## 2022-12-08 LAB
ANION GAP SERPL CALC-SCNC: 4 MMOL/L — LOW (ref 5–17)
APPEARANCE UR: CLEAR — SIGNIFICANT CHANGE UP
APTT BLD: 30.4 SEC — SIGNIFICANT CHANGE UP (ref 27.5–35.5)
BASOPHILS # BLD AUTO: 0.04 K/UL — SIGNIFICANT CHANGE UP (ref 0–0.2)
BASOPHILS NFR BLD AUTO: 0.5 % — SIGNIFICANT CHANGE UP (ref 0–2)
BILIRUB UR-MCNC: NEGATIVE — SIGNIFICANT CHANGE UP
BUN SERPL-MCNC: 8 MG/DL — SIGNIFICANT CHANGE UP (ref 7–23)
CALCIUM SERPL-MCNC: 8.8 MG/DL — SIGNIFICANT CHANGE UP (ref 8.5–10.1)
CHLORIDE SERPL-SCNC: 108 MMOL/L — SIGNIFICANT CHANGE UP (ref 96–108)
CO2 SERPL-SCNC: 28 MMOL/L — SIGNIFICANT CHANGE UP (ref 22–31)
COLOR SPEC: YELLOW — SIGNIFICANT CHANGE UP
CREAT SERPL-MCNC: 0.51 MG/DL — SIGNIFICANT CHANGE UP (ref 0.5–1.3)
DIFF PNL FLD: NEGATIVE — SIGNIFICANT CHANGE UP
EGFR: 120 ML/MIN/1.73M2 — SIGNIFICANT CHANGE UP
EOSINOPHIL # BLD AUTO: 0.08 K/UL — SIGNIFICANT CHANGE UP (ref 0–0.5)
EOSINOPHIL NFR BLD AUTO: 0.9 % — SIGNIFICANT CHANGE UP (ref 0–6)
GLUCOSE SERPL-MCNC: 90 MG/DL — SIGNIFICANT CHANGE UP (ref 70–99)
GLUCOSE UR QL: NEGATIVE — SIGNIFICANT CHANGE UP
HCT VFR BLD CALC: 42.4 % — SIGNIFICANT CHANGE UP (ref 34.5–45)
HGB BLD-MCNC: 14.3 G/DL — SIGNIFICANT CHANGE UP (ref 11.5–15.5)
IMM GRANULOCYTES NFR BLD AUTO: 0.2 % — SIGNIFICANT CHANGE UP (ref 0–0.9)
INR BLD: 1.04 RATIO — SIGNIFICANT CHANGE UP (ref 0.88–1.16)
KETONES UR-MCNC: ABNORMAL
LEUKOCYTE ESTERASE UR-ACNC: NEGATIVE — SIGNIFICANT CHANGE UP
LYMPHOCYTES # BLD AUTO: 2.13 K/UL — SIGNIFICANT CHANGE UP (ref 1–3.3)
LYMPHOCYTES # BLD AUTO: 24 % — SIGNIFICANT CHANGE UP (ref 13–44)
MCHC RBC-ENTMCNC: 30.4 PG — SIGNIFICANT CHANGE UP (ref 27–34)
MCHC RBC-ENTMCNC: 33.7 GM/DL — SIGNIFICANT CHANGE UP (ref 32–36)
MCV RBC AUTO: 90 FL — SIGNIFICANT CHANGE UP (ref 80–100)
MONOCYTES # BLD AUTO: 0.38 K/UL — SIGNIFICANT CHANGE UP (ref 0–0.9)
MONOCYTES NFR BLD AUTO: 4.3 % — SIGNIFICANT CHANGE UP (ref 2–14)
NEUTROPHILS # BLD AUTO: 6.21 K/UL — SIGNIFICANT CHANGE UP (ref 1.8–7.4)
NEUTROPHILS NFR BLD AUTO: 70.1 % — SIGNIFICANT CHANGE UP (ref 43–77)
NITRITE UR-MCNC: NEGATIVE — SIGNIFICANT CHANGE UP
PH UR: 6 — SIGNIFICANT CHANGE UP (ref 5–8)
PLATELET # BLD AUTO: 282 K/UL — SIGNIFICANT CHANGE UP (ref 150–400)
POTASSIUM SERPL-MCNC: 3.5 MMOL/L — SIGNIFICANT CHANGE UP (ref 3.5–5.3)
POTASSIUM SERPL-SCNC: 3.5 MMOL/L — SIGNIFICANT CHANGE UP (ref 3.5–5.3)
PROT UR-MCNC: NEGATIVE — SIGNIFICANT CHANGE UP
PROTHROM AB SERPL-ACNC: 12.1 SEC — SIGNIFICANT CHANGE UP (ref 10.5–13.4)
RBC # BLD: 4.71 M/UL — SIGNIFICANT CHANGE UP (ref 3.8–5.2)
RBC # FLD: 12.7 % — SIGNIFICANT CHANGE UP (ref 10.3–14.5)
SODIUM SERPL-SCNC: 140 MMOL/L — SIGNIFICANT CHANGE UP (ref 135–145)
SP GR SPEC: 1 — LOW (ref 1.01–1.02)
UROBILINOGEN FLD QL: NEGATIVE — SIGNIFICANT CHANGE UP
WBC # BLD: 8.86 K/UL — SIGNIFICANT CHANGE UP (ref 3.8–10.5)
WBC # FLD AUTO: 8.86 K/UL — SIGNIFICANT CHANGE UP (ref 3.8–10.5)

## 2022-12-08 PROCEDURE — 85610 PROTHROMBIN TIME: CPT

## 2022-12-08 PROCEDURE — 71046 X-RAY EXAM CHEST 2 VIEWS: CPT | Mod: 26

## 2022-12-08 PROCEDURE — 71046 X-RAY EXAM CHEST 2 VIEWS: CPT

## 2022-12-08 PROCEDURE — 86900 BLOOD TYPING SEROLOGIC ABO: CPT

## 2022-12-08 PROCEDURE — 93005 ELECTROCARDIOGRAM TRACING: CPT

## 2022-12-08 PROCEDURE — 99214 OFFICE O/P EST MOD 30 MIN: CPT | Mod: 25

## 2022-12-08 PROCEDURE — 80048 BASIC METABOLIC PNL TOTAL CA: CPT

## 2022-12-08 PROCEDURE — 86901 BLOOD TYPING SEROLOGIC RH(D): CPT

## 2022-12-08 PROCEDURE — 81003 URINALYSIS AUTO W/O SCOPE: CPT

## 2022-12-08 PROCEDURE — 86850 RBC ANTIBODY SCREEN: CPT

## 2022-12-08 PROCEDURE — 36415 COLL VENOUS BLD VENIPUNCTURE: CPT

## 2022-12-08 PROCEDURE — 87086 URINE CULTURE/COLONY COUNT: CPT

## 2022-12-08 PROCEDURE — 85025 COMPLETE CBC W/AUTO DIFF WBC: CPT

## 2022-12-08 PROCEDURE — 85730 THROMBOPLASTIN TIME PARTIAL: CPT

## 2022-12-08 PROCEDURE — 93010 ELECTROCARDIOGRAM REPORT: CPT

## 2022-12-08 NOTE — H&P PST ADULT - NSANTHOSAYNRD_GEN_A_CORE
No. CROW screening performed.  STOP BANG Legend: 0-2 = LOW Risk; 3-4 = INTERMEDIATE Risk; 5-8 = HIGH Risk

## 2022-12-08 NOTE — H&P PST ADULT - HISTORY OF PRESENT ILLNESS
41  42 y.o WD, WN female presents to PST with hx of left flank pain back on October 31. She went to the ER and reports diagnostics at that time revealed a UTI and an incidental finding of a right renal mass. Patient currently denies dysuria or urinary frequency but does report microscopic hematuria. She has followed with urology and now scheduled for a Robotic Right Partial Nephrectomy , possible Open , possible Radical

## 2022-12-08 NOTE — H&P PST ADULT - ASSESSMENT
42 y.o female scheduled for  42 y.o female scheduled for  Robotic Right Partial Nephrectomy , possible Open , possible Radical   Plan  1. Stop all NSAIDS, herbal supplements and vitamins for 7 days.  2. NPO at midnight.  3. Take the following medications--none-- with small sips of water on the morning of your procedure/surgery.  4. Use EZ sponges as directed  5. Labs, EKG, CXR as per surgeon  6. PMD JAZ Gordon  visit for optimization prior to surgery as per surgeon  7. COVID swab to be done 2022     CAPRINI SCORE    AGE RELATED RISK FACTORS                                                       MOBILITY RELATED FACTORS  [x ] Age 41-60 years                                            (1 Point)                  [ ] Bed rest                                                        (1 Point)  [ ] Age: 61-74 years                                           (2 Points)                [ ] Plaster cast                                                   (2 Points)  [ ] Age= 75 years                                              (3 Points)                 [ ] Bed bound for more than 72 hours                   (2 Points)    DISEASE RELATED RISK FACTORS                                               GENDER SPECIFIC FACTORS  [ ] Edema in the lower extremities                       (1 Point)                  [ ] Pregnancy                                                     (1 Point)  [ ] Varicose veins                                               (1 Point)                  [ ] Post-partum < 6 weeks                                   (1 Point)             [x ] BMI > 25 Kg/m2                                            (1 Point)                  [ ] Hormonal therapy  or oral contraception            (1 Point)                 [ ] Sepsis (in the previous month)                        (1 Point)                  [ ] History of pregnancy complications  [ ] Pneumonia or serious lung disease                                               [ ] Unexplained or recurrent                       (1 Point)           (in the previous month)                               (1 Point)  [ ] Abnormal pulmonary function test                     (1 Point)                 SURGERY RELATED RISK FACTORS  [ ] Acute myocardial infarction                              (1 Point)                 [ ]  Section                                            (1 Point)  [ ] Congestive heart failure (in the previous month)  (1 Point)                 [ ] Minor surgery                                                 (1 Point)   [ ] Inflammatory bowel disease                             (1 Point)                 [ ] Arthroscopic surgery                                        (2 Points)  [ ] Central venous access                                    (2 Points)                [ x] General surgery lasting more than 45 minutes   (2 Points)       [ ] Stroke (in the previous month)                          (5 Points)               [ ] Elective arthroplasty                                        (5 Points)                                                                                                                                               HEMATOLOGY RELATED FACTORS                                                 TRAUMA RELATED RISK FACTORS  [ ] Prior episodes of VTE                                     (3 Points)                 [ ] Fracture of the hip, pelvis, or leg                       (5 Points)  [ ] Positive family history for VTE                         (3 Points)                 [ ] Acute spinal cord injury (in the previous month)  (5 Points)  [ ] Prothrombin 69543 A                                      (3 Points)                 [ ] Paralysis  (less than 1 month)                          (5 Points)  [ ] Factor V Leiden                                             (3 Points)                 [ ] Multiple Trauma within 1 month                         (5 Points)  [ ] Lupus anticoagulants                                     (3 Points)                                                           [ ] Anticardiolipin antibodies                                (3 Points)                                                       [ ] High homocysteine in the blood                      (3 Points)                                             [ ] Other congenital or acquired thrombophilia       (3 Points)                                                [ ] Heparin induced thrombocytopenia                  (3 Points)                                          Total Score [     4     ]    The Caprini score indicates this patient is at risk for a VTE event (score 3-5).  Most surgical patients in this group would benefit from pharmacologic prophylaxis.  The surgical team will determine the balance between VTE risk and bleeding risk

## 2022-12-08 NOTE — H&P PST ADULT - NSICDXPASTSURGICALHX_GEN_ALL_CORE_FT
PAST SURGICAL HISTORY:  H/O umbilical hernia repair     H/O: hysterectomy     History of 3  sections

## 2022-12-09 DIAGNOSIS — Z01.818 ENCOUNTER FOR OTHER PREPROCEDURAL EXAMINATION: ICD-10-CM

## 2022-12-09 DIAGNOSIS — N28.89 OTHER SPECIFIED DISORDERS OF KIDNEY AND URETER: ICD-10-CM

## 2022-12-09 LAB
CULTURE RESULTS: SIGNIFICANT CHANGE UP
SPECIMEN SOURCE: SIGNIFICANT CHANGE UP

## 2022-12-15 ENCOUNTER — APPOINTMENT (OUTPATIENT)
Dept: UROLOGY | Facility: HOSPITAL | Age: 42
End: 2022-12-15
Payer: COMMERCIAL

## 2022-12-15 ENCOUNTER — INPATIENT (INPATIENT)
Facility: HOSPITAL | Age: 42
LOS: 0 days | Discharge: HOME | DRG: 442 | End: 2022-12-16
Attending: UROLOGY | Admitting: UROLOGY
Payer: COMMERCIAL

## 2022-12-15 ENCOUNTER — RESULT REVIEW (OUTPATIENT)
Age: 42
End: 2022-12-15

## 2022-12-15 VITALS
HEIGHT: 63 IN | HEART RATE: 69 BPM | SYSTOLIC BLOOD PRESSURE: 144 MMHG | WEIGHT: 147.71 LBS | OXYGEN SATURATION: 100 % | TEMPERATURE: 98 F | RESPIRATION RATE: 14 BRPM | DIASTOLIC BLOOD PRESSURE: 92 MMHG

## 2022-12-15 DIAGNOSIS — Z98.891 HISTORY OF UTERINE SCAR FROM PREVIOUS SURGERY: Chronic | ICD-10-CM

## 2022-12-15 DIAGNOSIS — C64.1 MALIGNANT NEOPLASM OF RIGHT KIDNEY, EXCEPT RENAL PELVIS: ICD-10-CM

## 2022-12-15 DIAGNOSIS — Z98.890 OTHER SPECIFIED POSTPROCEDURAL STATES: Chronic | ICD-10-CM

## 2022-12-15 DIAGNOSIS — N70.11 CHRONIC SALPINGITIS: ICD-10-CM

## 2022-12-15 DIAGNOSIS — Z90.710 ACQUIRED ABSENCE OF BOTH CERVIX AND UTERUS: Chronic | ICD-10-CM

## 2022-12-15 DIAGNOSIS — N28.89 OTHER SPECIFIED DISORDERS OF KIDNEY AND URETER: ICD-10-CM

## 2022-12-15 LAB
ANION GAP SERPL CALC-SCNC: 7 MMOL/L — SIGNIFICANT CHANGE UP (ref 5–17)
BASOPHILS # BLD AUTO: 0.03 K/UL — SIGNIFICANT CHANGE UP (ref 0–0.2)
BASOPHILS NFR BLD AUTO: 0.2 % — SIGNIFICANT CHANGE UP (ref 0–2)
BUN SERPL-MCNC: 11 MG/DL — SIGNIFICANT CHANGE UP (ref 7–23)
CALCIUM SERPL-MCNC: 8.3 MG/DL — LOW (ref 8.5–10.1)
CHLORIDE SERPL-SCNC: 107 MMOL/L — SIGNIFICANT CHANGE UP (ref 96–108)
CO2 SERPL-SCNC: 24 MMOL/L — SIGNIFICANT CHANGE UP (ref 22–31)
CREAT SERPL-MCNC: 0.78 MG/DL — SIGNIFICANT CHANGE UP (ref 0.5–1.3)
EGFR: 98 ML/MIN/1.73M2 — SIGNIFICANT CHANGE UP
EOSINOPHIL # BLD AUTO: 0.01 K/UL — SIGNIFICANT CHANGE UP (ref 0–0.5)
EOSINOPHIL NFR BLD AUTO: 0.1 % — SIGNIFICANT CHANGE UP (ref 0–6)
GLUCOSE SERPL-MCNC: 178 MG/DL — HIGH (ref 70–99)
HCT VFR BLD CALC: 43 % — SIGNIFICANT CHANGE UP (ref 34.5–45)
HGB BLD-MCNC: 14.6 G/DL — SIGNIFICANT CHANGE UP (ref 11.5–15.5)
IMM GRANULOCYTES NFR BLD AUTO: 0.4 % — SIGNIFICANT CHANGE UP (ref 0–0.9)
LYMPHOCYTES # BLD AUTO: 0.53 K/UL — LOW (ref 1–3.3)
LYMPHOCYTES # BLD AUTO: 3.2 % — LOW (ref 13–44)
MCHC RBC-ENTMCNC: 30.4 PG — SIGNIFICANT CHANGE UP (ref 27–34)
MCHC RBC-ENTMCNC: 34 GM/DL — SIGNIFICANT CHANGE UP (ref 32–36)
MCV RBC AUTO: 89.4 FL — SIGNIFICANT CHANGE UP (ref 80–100)
MONOCYTES # BLD AUTO: 0.23 K/UL — SIGNIFICANT CHANGE UP (ref 0–0.9)
MONOCYTES NFR BLD AUTO: 1.4 % — LOW (ref 2–14)
NEUTROPHILS # BLD AUTO: 15.91 K/UL — HIGH (ref 1.8–7.4)
NEUTROPHILS NFR BLD AUTO: 94.7 % — HIGH (ref 43–77)
PLATELET # BLD AUTO: 247 K/UL — SIGNIFICANT CHANGE UP (ref 150–400)
POTASSIUM SERPL-MCNC: 3.1 MMOL/L — LOW (ref 3.5–5.3)
POTASSIUM SERPL-SCNC: 3.1 MMOL/L — LOW (ref 3.5–5.3)
RBC # BLD: 4.81 M/UL — SIGNIFICANT CHANGE UP (ref 3.8–5.2)
RBC # FLD: 12.7 % — SIGNIFICANT CHANGE UP (ref 10.3–14.5)
SODIUM SERPL-SCNC: 138 MMOL/L — SIGNIFICANT CHANGE UP (ref 135–145)
WBC # BLD: 16.77 K/UL — HIGH (ref 3.8–10.5)
WBC # FLD AUTO: 16.77 K/UL — HIGH (ref 3.8–10.5)

## 2022-12-15 PROCEDURE — S2900: CPT

## 2022-12-15 PROCEDURE — C9399: CPT

## 2022-12-15 PROCEDURE — C1889: CPT

## 2022-12-15 PROCEDURE — 88304 TISSUE EXAM BY PATHOLOGIST: CPT

## 2022-12-15 PROCEDURE — 80048 BASIC METABOLIC PNL TOTAL CA: CPT

## 2022-12-15 PROCEDURE — 88307 TISSUE EXAM BY PATHOLOGIST: CPT

## 2022-12-15 PROCEDURE — 88304 TISSUE EXAM BY PATHOLOGIST: CPT | Mod: 26

## 2022-12-15 PROCEDURE — ZZZZZ: CPT

## 2022-12-15 PROCEDURE — 36415 COLL VENOUS BLD VENIPUNCTURE: CPT

## 2022-12-15 PROCEDURE — 99024 POSTOP FOLLOW-UP VISIT: CPT

## 2022-12-15 PROCEDURE — 88307 TISSUE EXAM BY PATHOLOGIST: CPT | Mod: 26

## 2022-12-15 PROCEDURE — 85025 COMPLETE CBC W/AUTO DIFF WBC: CPT

## 2022-12-15 RX ORDER — ACETAMINOPHEN 500 MG
1000 TABLET ORAL ONCE
Refills: 0 | Status: COMPLETED | OUTPATIENT
Start: 2022-12-15 | End: 2022-12-15

## 2022-12-15 RX ORDER — OXYCODONE AND ACETAMINOPHEN 5; 325 MG/1; MG/1
1 TABLET ORAL EVERY 4 HOURS
Refills: 0 | Status: DISCONTINUED | OUTPATIENT
Start: 2022-12-15 | End: 2022-12-16

## 2022-12-15 RX ORDER — OXYCODONE AND ACETAMINOPHEN 5; 325 MG/1; MG/1
2 TABLET ORAL EVERY 6 HOURS
Refills: 0 | Status: DISCONTINUED | OUTPATIENT
Start: 2022-12-15 | End: 2022-12-16

## 2022-12-15 RX ORDER — SODIUM CHLORIDE 9 MG/ML
1000 INJECTION, SOLUTION INTRAVENOUS
Refills: 0 | Status: DISCONTINUED | OUTPATIENT
Start: 2022-12-15 | End: 2022-12-15

## 2022-12-15 RX ORDER — POTASSIUM CHLORIDE 20 MEQ
10 PACKET (EA) ORAL
Refills: 0 | Status: COMPLETED | OUTPATIENT
Start: 2022-12-15 | End: 2022-12-15

## 2022-12-15 RX ORDER — CEFAZOLIN SODIUM 1 G
1000 VIAL (EA) INJECTION EVERY 8 HOURS
Refills: 0 | Status: COMPLETED | OUTPATIENT
Start: 2022-12-15 | End: 2022-12-16

## 2022-12-15 RX ORDER — TOBRAMYCIN 0.3 %
1 DROPS OPHTHALMIC (EYE) EVERY 4 HOURS
Refills: 0 | Status: COMPLETED | OUTPATIENT
Start: 2022-12-15 | End: 2022-12-16

## 2022-12-15 RX ORDER — OXYCODONE HYDROCHLORIDE 5 MG/1
5 TABLET ORAL ONCE
Refills: 0 | Status: DISCONTINUED | OUTPATIENT
Start: 2022-12-15 | End: 2022-12-15

## 2022-12-15 RX ORDER — HYDROMORPHONE HYDROCHLORIDE 2 MG/ML
0.5 INJECTION INTRAMUSCULAR; INTRAVENOUS; SUBCUTANEOUS
Refills: 0 | Status: DISCONTINUED | OUTPATIENT
Start: 2022-12-15 | End: 2022-12-16

## 2022-12-15 RX ORDER — HYDROMORPHONE HYDROCHLORIDE 2 MG/ML
1 INJECTION INTRAMUSCULAR; INTRAVENOUS; SUBCUTANEOUS EVERY 4 HOURS
Refills: 0 | Status: DISCONTINUED | OUTPATIENT
Start: 2022-12-15 | End: 2022-12-16

## 2022-12-15 RX ORDER — ONDANSETRON 8 MG/1
4 TABLET, FILM COATED ORAL ONCE
Refills: 0 | Status: DISCONTINUED | OUTPATIENT
Start: 2022-12-15 | End: 2022-12-15

## 2022-12-15 RX ORDER — HEPARIN SODIUM 5000 [USP'U]/ML
5000 INJECTION INTRAVENOUS; SUBCUTANEOUS EVERY 8 HOURS
Refills: 0 | Status: DISCONTINUED | OUTPATIENT
Start: 2022-12-15 | End: 2022-12-16

## 2022-12-15 RX ORDER — FENTANYL CITRATE 50 UG/ML
50 INJECTION INTRAVENOUS
Refills: 0 | Status: DISCONTINUED | OUTPATIENT
Start: 2022-12-15 | End: 2022-12-15

## 2022-12-15 RX ORDER — CEFAZOLIN SODIUM 1 G
1000 VIAL (EA) INJECTION EVERY 8 HOURS
Refills: 0 | Status: DISCONTINUED | OUTPATIENT
Start: 2022-12-15 | End: 2022-12-15

## 2022-12-15 RX ORDER — SODIUM CHLORIDE 9 MG/ML
1000 INJECTION INTRAMUSCULAR; INTRAVENOUS; SUBCUTANEOUS
Refills: 0 | Status: DISCONTINUED | OUTPATIENT
Start: 2022-12-15 | End: 2022-12-16

## 2022-12-15 RX ADMIN — HYDROMORPHONE HYDROCHLORIDE 0.5 MILLIGRAM(S): 2 INJECTION INTRAMUSCULAR; INTRAVENOUS; SUBCUTANEOUS at 17:30

## 2022-12-15 RX ADMIN — Medication 1000 MILLIGRAM(S): at 10:42

## 2022-12-15 RX ADMIN — HEPARIN SODIUM 5000 UNIT(S): 5000 INJECTION INTRAVENOUS; SUBCUTANEOUS at 22:23

## 2022-12-15 RX ADMIN — Medication 1 DROP(S): at 20:30

## 2022-12-15 RX ADMIN — Medication 100 MILLIEQUIVALENT(S): at 22:23

## 2022-12-15 RX ADMIN — HYDROMORPHONE HYDROCHLORIDE 1 MILLIGRAM(S): 2 INJECTION INTRAMUSCULAR; INTRAVENOUS; SUBCUTANEOUS at 22:35

## 2022-12-15 RX ADMIN — HYDROMORPHONE HYDROCHLORIDE 0.5 MILLIGRAM(S): 2 INJECTION INTRAMUSCULAR; INTRAVENOUS; SUBCUTANEOUS at 18:15

## 2022-12-15 RX ADMIN — Medication 100 MILLIEQUIVALENT(S): at 19:43

## 2022-12-15 RX ADMIN — Medication 1000 MILLIGRAM(S): at 23:37

## 2022-12-15 RX ADMIN — Medication 1000 MILLIGRAM(S): at 10:28

## 2022-12-15 RX ADMIN — Medication 100 MILLIEQUIVALENT(S): at 23:39

## 2022-12-15 RX ADMIN — HYDROMORPHONE HYDROCHLORIDE 0.5 MILLIGRAM(S): 2 INJECTION INTRAMUSCULAR; INTRAVENOUS; SUBCUTANEOUS at 18:40

## 2022-12-15 RX ADMIN — HYDROMORPHONE HYDROCHLORIDE 1 MILLIGRAM(S): 2 INJECTION INTRAMUSCULAR; INTRAVENOUS; SUBCUTANEOUS at 23:35

## 2022-12-15 NOTE — BRIEF OPERATIVE NOTE - NSICDXBRIEFPROCEDURE_GEN_ALL_CORE_FT
PROCEDURES:  Exam under anesthesia, gynecologic 15-Dec-2022 16:59:59  Kush Henson  Right salpingectomy 15-Dec-2022 17:00:09  Kush Henson  
PROCEDURES:  Robot-assisted partial nephrectomy 15-Dec-2022 16:49:59  Lesa Faulkner A

## 2022-12-15 NOTE — BRIEF OPERATIVE NOTE - NSICDXBRIEFPREOP_GEN_ALL_CORE_FT
PRE-OP DIAGNOSIS:  Renal mass, right 15-Dec-2022 16:46:27  Lesa Faulkner A  
PRE-OP DIAGNOSIS:  Hydrosalpinx 15-Dec-2022 17:01:42  Kush Henson  Ovary, torsion 15-Dec-2022 17:02:11  Kush Henson

## 2022-12-15 NOTE — PROGRESS NOTE ADULT - ASSESSMENT
Ass: 42y Female POD 0 S/P Robot-assisted partial nephrectomy, Right salpingectomy  Plan:  Replace Potassium  Tobramycin Eye drops for left corneal abrasion  DVT prophylaxis/OOB  Encourage Incentive spirometry  Strict I&O's  pain control  ADAT  AM labs

## 2022-12-15 NOTE — PATIENT PROFILE ADULT - FALL HARM RISK - UNIVERSAL INTERVENTIONS
Bed in lowest position, wheels locked, appropriate side rails in place/Call bell, personal items and telephone in reach/Instruct patient to call for assistance before getting out of bed or chair/Non-slip footwear when patient is out of bed/[place] to call system/Physically safe environment - no spills, clutter or unnecessary equipment/Purposeful Proactive Rounding/Room/bathroom lighting operational, light cord in reach

## 2022-12-15 NOTE — BRIEF OPERATIVE NOTE - NSICDXBRIEFPOSTOP_GEN_ALL_CORE_FT
POST-OP DIAGNOSIS:  Hydrosalpinx 15-Dec-2022 17:00:45  Kush Henson  
POST-OP DIAGNOSIS:  Renal mass 15-Dec-2022 16:48:23  Lesa Faulkner A

## 2022-12-15 NOTE — PROGRESS NOTE ADULT - SUBJECTIVE AND OBJECTIVE BOX
POC S/P Robot-assisted partial nephrectomy, Right salpingectomy    The pt is a 42y Female with PMH of Right renal mass presents POD 0 S/P Robot-assisted Partial Right Nephrectomy, Right salpingectomy.  Pt examined, complains of mild incisional pain and pain in left eye due to corneal abrasion. Denies SOB/CP/N/V.     ceFAZolin  Injectable. 1000 milliGRAM(s) IV Push every 8 hours  fentaNYL    Injectable 50 MICROGram(s) IV Push every 10 minutes PRN  heparin   Injectable 5000 Unit(s) SubCutaneous every 8 hours  HYDROmorphone  Injectable 0.5 milliGRAM(s) IV Push every 10 minutes PRN  HYDROmorphone  Injectable 1 milliGRAM(s) IV Push every 4 hours PRN  ondansetron Injectable 4 milliGRAM(s) IV Push once PRN  oxycodone    5 mG/acetaminophen 325 mG 1 Tablet(s) Oral every 4 hours PRN  oxycodone    5 mG/acetaminophen 325 mG 2 Tablet(s) Oral every 6 hours PRN  oxyCODONE    IR 5 milliGRAM(s) Oral once PRN  potassium chloride  10 mEq/100 mL IVPB 10 milliEquivalent(s) IV Intermittent every 1 hour  sodium chloride 0.9%. 1000 milliLiter(s) IV Continuous <Continuous>  tobramycin 0.3% Ophthalmic Solution 1 Drop(s) Left EYE every 4 hours      Vital Signs Last 24 Hrs  T(C): 36.4 (15 Dec 2022 20:30), Max: 36.6 (15 Dec 2022 16:20)  T(F): 97.6 (15 Dec 2022 20:30), Max: 97.8 (15 Dec 2022 16:20)  HR: 92 (15 Dec 2022 20:30) (69 - 99)  BP: 124/78 (15 Dec 2022 20:30) (123/73 - 146/78)  BP(mean): --  RR: 16 (15 Dec 2022 20:30) (12 - 16)  SpO2: 100% (15 Dec 2022 20:30) (100% - 100%)    Parameters below as of 15 Dec 2022 16:20  Patient On (Oxygen Delivery Method): nasal cannula  O2 Flow (L/min): 2      I&O's Summary    15 Dec 2022 07:01  -  15 Dec 2022 21:39  --------------------------------------------------------  IN: 3100 mL / OUT: 990 mL / NET: 2110 mL      Physical Exam  Gen: NAD, comfortable in bed  Lungs: CTAB  Heart: S1/S2, RRR  Abd: non distended, incisions with dermabond clean and dry, CM in place with dark bloody drainage, +BS, Soft, + incisional tenderness, no rebound, no guarding.   : sewell in place with blood tinged urine  Neuro: A&Ox3, CNII-XII grossly intact  Extremities: venodynes in place, no edema                          14.6   16.77 )-----------( 247      ( 15 Dec 2022 17:27 )             43.0       12-15    138  |  107  |  11  ----------------------------<  178<H>  3.1<L>   |  24  |  0.78    Ca    8.3<L>      15 Dec 2022 17:27

## 2022-12-15 NOTE — PATIENT PROFILE ADULT - NSPRONUTRITIONRISK_GEN_A_NUR
Spoke with patient, and he said is wife will call back to schedule OV to discuss geriatric assessment.
No indicators present

## 2022-12-16 ENCOUNTER — TRANSCRIPTION ENCOUNTER (OUTPATIENT)
Age: 42
End: 2022-12-16

## 2022-12-16 VITALS
TEMPERATURE: 99 F | HEART RATE: 79 BPM | SYSTOLIC BLOOD PRESSURE: 121 MMHG | DIASTOLIC BLOOD PRESSURE: 69 MMHG | OXYGEN SATURATION: 99 % | RESPIRATION RATE: 18 BRPM

## 2022-12-16 LAB
ANION GAP SERPL CALC-SCNC: 6 MMOL/L — SIGNIFICANT CHANGE UP (ref 5–17)
BASOPHILS # BLD AUTO: 0.01 K/UL — SIGNIFICANT CHANGE UP (ref 0–0.2)
BASOPHILS NFR BLD AUTO: 0.1 % — SIGNIFICANT CHANGE UP (ref 0–2)
BUN SERPL-MCNC: 9 MG/DL — SIGNIFICANT CHANGE UP (ref 7–23)
CALCIUM SERPL-MCNC: 8.1 MG/DL — LOW (ref 8.5–10.1)
CHLORIDE SERPL-SCNC: 109 MMOL/L — HIGH (ref 96–108)
CO2 SERPL-SCNC: 25 MMOL/L — SIGNIFICANT CHANGE UP (ref 22–31)
CREAT SERPL-MCNC: 0.58 MG/DL — SIGNIFICANT CHANGE UP (ref 0.5–1.3)
EGFR: 117 ML/MIN/1.73M2 — SIGNIFICANT CHANGE UP
EOSINOPHIL # BLD AUTO: 0 K/UL — SIGNIFICANT CHANGE UP (ref 0–0.5)
EOSINOPHIL NFR BLD AUTO: 0 % — SIGNIFICANT CHANGE UP (ref 0–6)
GLUCOSE SERPL-MCNC: 102 MG/DL — HIGH (ref 70–99)
HCT VFR BLD CALC: 36.3 % — SIGNIFICANT CHANGE UP (ref 34.5–45)
HGB BLD-MCNC: 12.2 G/DL — SIGNIFICANT CHANGE UP (ref 11.5–15.5)
IMM GRANULOCYTES NFR BLD AUTO: 0.4 % — SIGNIFICANT CHANGE UP (ref 0–0.9)
LYMPHOCYTES # BLD AUTO: 1.18 K/UL — SIGNIFICANT CHANGE UP (ref 1–3.3)
LYMPHOCYTES # BLD AUTO: 9.3 % — LOW (ref 13–44)
MCHC RBC-ENTMCNC: 30.2 PG — SIGNIFICANT CHANGE UP (ref 27–34)
MCHC RBC-ENTMCNC: 33.6 GM/DL — SIGNIFICANT CHANGE UP (ref 32–36)
MCV RBC AUTO: 89.9 FL — SIGNIFICANT CHANGE UP (ref 80–100)
MONOCYTES # BLD AUTO: 0.93 K/UL — HIGH (ref 0–0.9)
MONOCYTES NFR BLD AUTO: 7.3 % — SIGNIFICANT CHANGE UP (ref 2–14)
NEUTROPHILS # BLD AUTO: 10.52 K/UL — HIGH (ref 1.8–7.4)
NEUTROPHILS NFR BLD AUTO: 82.9 % — HIGH (ref 43–77)
PLATELET # BLD AUTO: 233 K/UL — SIGNIFICANT CHANGE UP (ref 150–400)
POTASSIUM SERPL-MCNC: 3.9 MMOL/L — SIGNIFICANT CHANGE UP (ref 3.5–5.3)
POTASSIUM SERPL-SCNC: 3.9 MMOL/L — SIGNIFICANT CHANGE UP (ref 3.5–5.3)
RBC # BLD: 4.04 M/UL — SIGNIFICANT CHANGE UP (ref 3.8–5.2)
RBC # FLD: 12.8 % — SIGNIFICANT CHANGE UP (ref 10.3–14.5)
SARS-COV-2 N GENE NPH QL NAA+PROBE: NOT DETECTED
SODIUM SERPL-SCNC: 140 MMOL/L — SIGNIFICANT CHANGE UP (ref 135–145)
WBC # BLD: 12.69 K/UL — HIGH (ref 3.8–10.5)
WBC # FLD AUTO: 12.69 K/UL — HIGH (ref 3.8–10.5)

## 2022-12-16 PROCEDURE — 50543 LAPARO PARTIAL NEPHRECTOMY: CPT | Mod: AS,RT

## 2022-12-16 PROCEDURE — 76998 US GUIDE INTRAOP: CPT | Mod: 26,AS,RT

## 2022-12-16 RX ORDER — OXYCODONE HYDROCHLORIDE 5 MG/1
5 TABLET ORAL EVERY 4 HOURS
Refills: 0 | Status: DISCONTINUED | OUTPATIENT
Start: 2022-12-16 | End: 2022-12-16

## 2022-12-16 RX ORDER — ACETAMINOPHEN 500 MG
2 TABLET ORAL
Qty: 0 | Refills: 0 | DISCHARGE

## 2022-12-16 RX ORDER — OXYCODONE HYDROCHLORIDE 5 MG/1
1 TABLET ORAL
Qty: 12 | Refills: 0
Start: 2022-12-16 | End: 2022-12-18

## 2022-12-16 RX ORDER — ACETAMINOPHEN 500 MG
1000 TABLET ORAL EVERY 6 HOURS
Refills: 0 | Status: DISCONTINUED | OUTPATIENT
Start: 2022-12-16 | End: 2022-12-16

## 2022-12-16 RX ORDER — SENNA PLUS 8.6 MG/1
2 TABLET ORAL
Qty: 10 | Refills: 0
Start: 2022-12-16 | End: 2022-12-20

## 2022-12-16 RX ADMIN — Medication 1000 MILLIGRAM(S): at 14:26

## 2022-12-16 RX ADMIN — OXYCODONE HYDROCHLORIDE 5 MILLIGRAM(S): 5 TABLET ORAL at 11:01

## 2022-12-16 RX ADMIN — HYDROMORPHONE HYDROCHLORIDE 1 MILLIGRAM(S): 2 INJECTION INTRAMUSCULAR; INTRAVENOUS; SUBCUTANEOUS at 06:57

## 2022-12-16 RX ADMIN — Medication 1 DROP(S): at 00:56

## 2022-12-16 RX ADMIN — HEPARIN SODIUM 5000 UNIT(S): 5000 INJECTION INTRAVENOUS; SUBCUTANEOUS at 05:20

## 2022-12-16 RX ADMIN — SODIUM CHLORIDE 125 MILLILITER(S): 9 INJECTION INTRAMUSCULAR; INTRAVENOUS; SUBCUTANEOUS at 00:38

## 2022-12-16 RX ADMIN — Medication 1000 MILLIGRAM(S): at 05:20

## 2022-12-16 RX ADMIN — Medication 1 DROP(S): at 05:20

## 2022-12-16 RX ADMIN — Medication 1000 MILLIGRAM(S): at 13:13

## 2022-12-16 NOTE — DISCHARGE NOTE PROVIDER - PROVIDER TOKENS
PROVIDER:[TOKEN:[[id number]],FOLLOWUP:[1 week]],PROVIDER:[TOKEN:[[id number]],FOLLOWUP:[1 week]]

## 2022-12-16 NOTE — DISCHARGE NOTE PROVIDER - HOSPITAL COURSE
42 y.o WD, WN female with hx of left flank pain back on . She went to the ER and reports diagnostics at that time revealed a UTI. CT was done to evaluate a rt adenxal mass and an incidental finding of a right renal mass. Patient currently denies dysuria or urinary frequency but does report microscopic hematuria. She has followed with urology and now scheduled for a Robotic Right Partial Nephrectomy , as well as rt salpingectomy.     PAST MEDICAL HISTORY: Right renal mass , UTI (urinary tract infection).   PAST SURGICAL HISTORY: H/O umbilical hernia repair , H/O: hysterectomy , History of 3  sections.     Pt was admitted to  on 22 taken to the OR underwent a Robot-assisted partial nephrectomy, Right salpingectomy   Post op course was stable- Fiore removed POD 1   Diet was advanced and pt was OOB ambulating.  AM labs stable     Discharged home in stable condition on  to follow up as out pt

## 2022-12-16 NOTE — DISCHARGE NOTE PROVIDER - NSDCMRMEDTOKEN_GEN_ALL_CORE_FT
oxyCODONE 5 mg oral tablet: 1 tab(s) orally every 6 hours, As Needed -for moderate pain MDD:4   senna (sennosides) 15 mg oral tablet: 2 tab(s) orally once a day (at bedtime), As Needed -for constipation   Tylenol 500 mg oral tablet: 2 tab(s) orally every 6 hours for mild pain

## 2022-12-16 NOTE — DISCHARGE NOTE PROVIDER - CARE PROVIDER_API CALL
Ja Edward)  Urology  [address] [place] [address]  Phone: [phone]  Fax: [phone]  Follow Up Time: 1 week    Kush Henson)  Obstetrics and Gynecology  [address] [place] [address]  Phone: [phone]  Fax: [phone]  Follow Up Time: 1 week

## 2022-12-16 NOTE — DISCHARGE NOTE PROVIDER - CARE PROVIDERS DIRECT ADDRESSES
,[email or web address]

## 2022-12-16 NOTE — CHART NOTE - NSCHARTNOTEFT_GEN_A_CORE
Heart Failure Clinic       Visit Date: 5/10/2022  Cardiologist:  Dr. Rusty Vasquez  Primary Care Physician: Dr. Damion Smith MD    Shaquille Brown is a 65 y.o. male who presents today for:  Chief Complaint   Patient presents with    Check-Up    Congestive Heart Failure       HPI:     TYPE HF: HFrEF 35 (2021)  Cause: nonsichemic, valvular (moderate as noted on 11/2020 echo)  Device: lifevest  HX: COPD, CKD, Hep C, HLD, HTN, Cirrhosis d/t ETOH abuse, Brain tumor  Dry Wt:  Unknown    Shaquille Brown is a 65 y.o. male who presents to the office for a follow up patient visit in the heart failure clinic. Concerns today: pt called in on Friday c/o lower leg selling. His lasix was increased to 40 BID from 40 am, 20pm. He had just completed a BNP and chest xray that WNL. Since he has developed N/V and has had decreased urine output. Pt is also noting dizziness and feeling lightheaded. Visit on 5/4: no significant weight gain or weight loss. Still wearing oxygen most nights. States that he eats at [employer] and eats out after Dr. Conchita Bojorquez. Still experiencing feeling lightheaded and dizzy, along with SOB with some improvement.      Visit on 3/21/22: total of 80lbs lost since starting diuretics. Notes improvement of SOB w/ water loss and new inhaler. Improvement of leg swelling and bloating. Still wearing oxygen at night that was prescribed to him by his PCP prior to hospital admission. His diet consists primarily of soup and rickey noodles.  Also eats a lot of frozen meat loaf  Weight trend: 318 at D/C; 322 today; 360 prior to admit      Following Pulmonary   Acute hypoxic respiratory failure due to decompensated chronic systolic congestive heart failure VS COPD VS other etiologies.   -COPD exacerbation due to acute bronchitis with  bacterial Vs viral etiology.  -Acute decompensation of chronic-systolic congestive heart failure.  -Abnormal chest x-ray- ?  Left-sided pleural effusion-Improved on a chest PM reassessment     Pt tolerated regular diet (small amount- Passing flatus no BM yet-   OOB ambulating    MEDICATIONS  (STANDING):  acetaminophen     Tablet .. 1000 milliGRAM(s) Oral every 6 hours  heparin   Injectable 5000 Unit(s) SubCutaneous every 8 hours  sodium chloride 0.9%. 1000 milliLiter(s) (75 mL/Hr) IV Continuous <Continuous>    MEDICATIONS  (PRN):  oxyCODONE    IR 5 milliGRAM(s) Oral every 4 hours PRN Moderate Pain (4 - 6)      Vital Signs Last 24 Hrs  T(C): 37.1 (16 Dec 2022 08:58), Max: 37.2 (15 Dec 2022 22:16)  T(F): 98.8 (16 Dec 2022 08:58), Max: 99 (15 Dec 2022 22:16)  HR: 81 (16 Dec 2022 08:58) (80 - 99)  BP: 106/62 (16 Dec 2022 08:58) (106/62 - 146/78)  BP(mean): 63 (16 Dec 2022 04:25) (63 - 78)  RR: 17 (16 Dec 2022 08:58) (12 - 17)  SpO2: 96% (16 Dec 2022 08:58) (96% - 100%)    Parameters below as of 16 Dec 2022 08:58  Patient On (Oxygen Delivery Method): room air    PHYSICAL EXAM:    Gastrointestinal: Wounds CDI CM in place serosanguinous Soft      Genitourinary:  Fiore removed VOIDING     Extremities: no CCE      I&O's Detail    15 Dec 2022 07:01  -  16 Dec 2022 07:00  --------------------------------------------------------  IN:    Other (mL): 1600 mL    sodium chloride 0.9%: 1500 mL  Total IN: 3100 mL    OUT:    Bulb (mL): 225 mL    Indwelling Catheter - Urethral (mL): 1855 mL  Total OUT: 2080 mL    Total NET: 1020 mL      16 Dec 2022 07:01  -  16 Dec 2022 14:45  --------------------------------------------------------  IN:  Total IN: 0 mL    OUT:    Bulb (mL): 40 mL    Indwelling Catheter - Urethral (mL): 345 mL    Voided (mL): 800 mL  Total OUT: 1185 mL    Total NET: -1185 mL    CM DC'd    A/P   Continued good post op recovery  Stable VS  Meets criteria for DC home x-ray performed on 11 March 2022.  -Severe chronic obstructive pulmonary disease.  -Chronic use of tobacco smoking for 40 years with 1 pack of cigarettes per day. -Type 2 diabetes mellitus  -Essential hypertension.  -Legally blind     Hospitalization:       3/6/22-3/15/22     The patient is a 57 y. o. male who presents with insidious onset of shortness of breath, legs swelling and weight gain in the last few weeks. He endorses significant orthopnea and PND. He has cough,wheezes. He is on a diuretic at home. His weight gain persisted and the SOB got worse. He reports increased fatigue. He had a fall at home on account of increasing weakness and fatigue, bruised his right knee. No head injury and no loss of consciousness with the fall. Subsequently presented to ER. ER evaluation showed evidence of fluid overload with elevated BNP, pulmonary congestion        Activity: ADLs performed with limitations.  Becomes SOB, has to sit down and take multiple breaks  Diet: educated today     Patient has:  Chest Pain: no  SOB: yes  Orthopnea/PND: yes improved                        TAPAN: oxygen at night, negative for TAPAN  Edema: yes improved  Fatigue: no  Abdominal bloating: yes improved  Cough: no  Appetite: good  Home weight: daily in the morning  Home blood pressure: no, going to get a cuff    Past Medical History:   Diagnosis Date    Acute kidney injury ([address] 12/2020    due to Enterococous Bacteremia , fluid overload, low sodium    Amputation of right great toe [facility] 02/18/2021    Asthma     Brain tumor ([facility]     Cirrhosis (HCC)     ETOH abuse    CKD (chronic kidney disease)     l.brown-osu spetie    COPD (chronic obstructive pulmonary disease) (HCC)     Diabetes mellitus (HCC)     type 2-insulin lantus and humalog    Falling     Glaucoma     Hepatitis C     History of blood transfusion     s/p nasal surgery    Hyperlipidemia     Hypertension     Nallu    Legally blind     Obesity     Pain management     karol marzec-percocet Rx    Right foot infection 11/2021    Seizures ([address]      Past Surgical History:   Procedure Laterality Date    BACK SURGERY      CARDIAC CATHETERIZATION  08/03/2021    EYE SURGERY      FIBULA FRACTURE SURGERY Left 03/21/2019    LEFT FIBULAR SHAFT ORIF performed by Abimael Martell DPM at [address] Right     Steel pins in place    FRACTURE SURGERY      NASAL SINUS SURGERY Bilateral 11/29/2020    FLEXIBLE BRONCHOSCOPY WITH BRONCHOALVEOLAR LAVAGE WITH CULTURES. NASAL ENDOSCOPY WITH POSSIBLE CAUTERY ADN NASAL PACKING performed by Pasha Plunkett MD at [address]  01/2020    SPINE SURGERY N/A 02/19/2021    KYPHOPLASTY at T4, L2, L4 performed by Tripp Baca MD at [address] [place] [address] N/A 8/24/2021    LUMBAR 3 AND LUMBAR 5 KYPHOPLASTY performed by Eric Gipson MD at [facility] 09/23/2020    [facility], REMOVAL OF HARDWARE RIGHT HALLUX performed by Benita Brandon DPM at [address] [facility] N/A 04/25/2019    EGD BIOPSY performed by Halie Shah MD at [address] [facility]     Family History   Problem Relation Age of Onset    Heart Attack Father     Heart Attack Brother         pneumonia    No Known Problems Mother     Cancer Sister         breast    No Known Problems Maternal Grandmother     No Known Problems Maternal Grandfather     Liver Cancer Paternal Grandmother     Heart Attack Paternal Grandfather     Heart Disease Brother         stents    Colon Cancer Neg Hx     Colon Polyps Neg Hx      Social History     Tobacco Use    Smoking status: Current Every Day Smoker     Packs/day: 1.00     Years: 40.00     Pack years: 40.00     Types: Cigarettes    Smokeless tobacco: Never Used    Tobacco comment: Currently smoking electronic cigarettes   Substance Use Topics    Alcohol use:  Yes     Alcohol/week: 3.0 standard drinks     Types: 3 Cans of beer per week No current facility-administered medications for this visit. No current outpatient medications on file.      Facility-Administered Medications Ordered in Other Visits   Medication Dose Route Frequency Provider Last Rate Last Admin    hydrALAZINE (APRESOLINE) tablet 50 mg  50 mg Oral 3 times per day MEGHAN San - CNP   50 mg at 05/11/22 0343    metoprolol succinate (TOPROL XL) extended release tablet 100 mg  100 mg Oral Daily MEGHAN San CNP        albuterol (PROVENTIL) nebulizer solution 2.5 mg  2.5 mg Nebulization Q6H PRN Rosendo Dials, APRN - CNP        aspirin chewable tablet 81 mg  81 mg Oral Daily Rosendo Dials, APRN - CNP        DULoxetine (CYMBALTA) extended release capsule 60 mg  60 mg Oral Daily Rosendo Dials, APRN - CNP        latanoprost (XALATAN) 0.005 % ophthalmic solution 1 drop  1 drop Both Eyes Nightly Rosendo Kellogg, APRN - CNP   1 drop at 05/10/22 2025    rosuvastatin (CRESTOR) tablet 20 mg  20 mg Oral Nightly Rosendo Dials, APRN - CNP   20 mg at 05/10/22 2025    glucose chewable tablet 16 g  4 tablet Oral PRN Rosendo Dials, APRN - CNP        dextrose bolus 10% 125 mL  125 mL IntraVENous PRN Rosendo Dials, APRN - CNP        Or    dextrose bolus 10% 250 mL  250 mL IntraVENous PRN Rosendo Dials, APRN - CNP        glucagon (rDNA) injection 1 mg  1 mg IntraMUSCular PRN Rosendo Januszs, APRN - CNP        dextrose 5 % solution  100 mL/hr IntraVENous PRN Rosendo Dials, APRN - CNP        sodium chloride flush 0.9 % injection 10 mL  10 mL IntraVENous 2 times per day Rosendo Valdez, APRN - CNP        sodium chloride flush 0.9 % injection 10 mL  10 mL IntraVENous PRN Rosendo Dials, APRN - CNP        0.9 % sodium chloride infusion   IntraVENous PRN Rosendo Dials, APRN - CNP        polyethylene glycol (GLYCOLAX) packet 17 g  17 g Oral Daily PRN Rosendo Dials, APRN - CNP        acetaminophen (TYLENOL) tablet 650 mg  650 mg Oral Q6H PRN Mauricio Barreto, APRN - CNP   650 mg at 05/10/22 2330    Or    acetaminophen (TYLENOL) suppository 650 mg  650 mg Rectal Q6H PRN Mauricio Barreto, APRN - CNP        insulin glargine (LANTUS) injection vial 42 Units  42 Units SubCUTAneous BID Mauricio Barreto, APRN - CNP   42 Units at 05/10/22 2025    insulin lispro (HUMALOG) injection vial 0-12 Units  0-12 Units SubCUTAneous TID WC Mauricio Barreto, APRN - CNP   2 Units at 05/10/22 1800    insulin lispro (HUMALOG) injection vial 0-6 Units  0-6 Units SubCUTAneous Nightly Mauricio Barreto, APRN - CNP        lacosamide (VIMPAT) 50 mg in sodium chloride 0.9 % 55 mL IVPB  50 mg IntraVENous BID Arnold Sanford PA-C        0.9 % sodium chloride infusion   IntraVENous Continuous Mauricio Barreto APRN -  mL/hr at 05/11/22 2132 Rate Verify at 05/11/22 0633    albumin human 25 % IV solution 25 g  25 g IntraVENous Q6H Mauricio Barreto, APRN - CNP   Stopped at 05/11/22 0616    oxyCODONE-acetaminophen (PERCOCET) 7.5-325 MG per tablet 1 tablet  1 tablet Oral Q8H PRN Mauricio Barreto, APRN - CNP   1 tablet at 05/11/22 0343     Allergies   Allergen Reactions    Adhesive Tape Rash     Bandaid    Keppra [Levetiracetam] Rash       SUBJECTIVE:   Review of Systems   Constitutional: Positive for fatigue. Negative for activity change, appetite change and diaphoresis. Respiratory: Negative for cough and shortness of breath. Cardiovascular: Negative for chest pain, palpitations and leg swelling. Gastrointestinal: Positive for nausea and vomiting. Negative for abdominal distention. Neurological: Positive for dizziness, weakness and light-headedness. Negative for headaches. Hematological: Negative for adenopathy. Psychiatric/Behavioral: Negative for sleep disturbance.        OBJECTIVE:   Today's Vitals:  BP (!) 69/45 (Site: Right Upper Arm)   Pulse 101   Temp 96.6 °F (35.9 °C)   Ht 6' 3\" (1.905 m)   Wt (!) 326 lb (147.9 kg)   SpO2 92%   BMI 40.75 kg/m²     Physical Exam  Vitals reviewed. Constitutional:       General: He is not in acute distress. Appearance: Normal appearance. He is well-developed. He is not diaphoretic. HENT:      Head: Normocephalic and atraumatic. Eyes:      Conjunctiva/sclera: Conjunctivae normal.   Cardiovascular:      Rate and Rhythm: Regular rhythm. Tachycardia present. Heart sounds: Normal heart sounds. No murmur heard. Pulmonary:      Effort: Pulmonary effort is normal. No respiratory distress. Breath sounds: Normal breath sounds. No wheezing or rales. Abdominal:      General: Bowel sounds are normal. There is no distension. Palpations: Abdomen is soft. Tenderness: There is no abdominal tenderness. Musculoskeletal:         General: Normal range of motion. Cervical back: Normal range of motion and neck supple. Right lower leg: No edema. Left lower leg: No edema. Skin:     General: Skin is warm and dry. Capillary Refill: Capillary refill takes less than 2 seconds. Neurological:      Mental Status: He is alert and oriented to person, place, and time. Coordination: Coordination normal.   Psychiatric:         Behavior: Behavior normal.         Wt Readings from Last 3 Encounters:   05/10/22 (!) 329 lb 9.4 oz (149.5 kg)   05/10/22 (!) 326 lb (147.9 kg)   05/04/22 (!) 326 lb (147.9 kg)     BP Readings from Last 3 Encounters:   05/11/22 (!) 154/82   05/10/22 (!) 69/45   05/04/22 116/80     Pulse Readings from Last 3 Encounters:   05/11/22 108   05/10/22 101   05/04/22 88     Body mass index is 40.75 kg/m².     ECHO:    Summary   Baseline/ pre-dobutamine / Rest / finding   ef 35 to 40% ( 37%)   LOBITO 1 cm2   peak and mean gradient of 35 and 23 mmhg respectively   Peak velocity of 3 m/sec      POST PEAK DOBUTAMINE INFUSION OF 20 MC/KG/MIN RESULTS   LV FUNCTION/EF/ IMPROVED TO 50% SHOWING GOOD CONTRACTILE RESERVE   NO SEGMENTAL WALL MOTION ABNORMALITY INDUCED   PEAK TRANSAORTIC VELOCITY OF 4.1M/SEC   PEAK AND MEAN TRANSAORTIC GRADIENT OF 70 MMHG AND 38 MMHG RESPECTIVELY   AORTIC VALVE AREA WORSENED TO 0.8 CM2   THIS IS CONSISTENT WITH SEVERE AORTIC STENOSIS      Signature      ----------------------------------------------------------------   Electronically signed by Wing Hebert GOLDSTEIN (Interpreting   physician) on 03/14/2022 at 07:04 PM     Summary  Technically difficult examination. Left Ventricular size is Mildly increased . Normal left ventricular wall thickness. There was severe global hypokinesis of the left ventricle. Systolic function was severely reduced. Ejection fraction is visually estimated in the range of 35%. The left atrium is Moderately dilated. There is moderate-to-severe aortic stenosis with valve area of 0.9 to 1 sq cm. The maximum aortic valve gradient is 40 mmHg, the mean gradient is 25  mmHg, and the peak velocity is 3.1 m/s.     Signature     ----------------------------------------------------------------  Electronically signed by Wing Hebert GOLDSTEIN (Interpreting  physician) on 03/07/2022 at 05:58 PM  ----------------------------------------------------------------        Summary   Technically difficult study due to poor acoustic windows.   Left ventricular size is normal and systolic function is severely reduced.   Ejection fraction was estimated at 35-40%. LV wall thickness is withinnormal limits.   Moderately dilated left atrium.   Thickened and calcified aortic valve leaflets with reduced excursion.   DOPPLER: Transaortic peak velocity 3 m/s, mean gradient of 20 mm Hg and   calculated LOBITO was 1.2 cm2.  There is moderate aortic stenosis present.   Mild aortic regurgitation is noted.   Calcification of the mitral valve noted.   Mild mitral regurgitation is present.   Mild tricuspid regurgitation visualized.      Signature   ----------------------------------------------------------------   Electronically signed by Tanya Sanchez MD (Interpreting   physician) on 07/22/2021 at 10:00 PM     CATH/STRESS:       Summary   Lexiscan EKG stress test is non-diagnostic for ischemia.   Calculated gated LVEF 47 %.   The T.I.D. ratio was 1.14 .   There was a small to moderate sized, moderate in intensity, fixed   myocardial perfusion defect of the inferior wall.   This study was negative for ischemia.      Recommendation   Clinical correlation is recommended.   Aggressive risk factor management.   Medical management.     Cath:8/2021  CORONARY ANATOMY:  Right Coronary: Dominant and patent  Left Main: Patent  Left Circumflex: Patent  Left Anterior Descending: Patent     LVEDP was measured at 14 mmHg. LVEF was estimated at 35%. Mean gradient of 22mm Hg across aortic valve      Results reviewed:  BNP:   Lab Results   Component Value Date     (H) 03/28/2022     CBC:   Lab Results   Component Value Date    WBC 6.3 05/11/2022    RBC 3.36 05/11/2022    HGB 10.1 05/11/2022    HCT 32.0 05/11/2022    PLT 89 05/11/2022     CMP:    Lab Results   Component Value Date     05/11/2022    K 3.6 05/11/2022     05/11/2022    CO2 25 05/11/2022    BUN 33 05/11/2022    CREATININE 2.1 05/11/2022    LABGLOM 33 05/11/2022    GLUCOSE 168 05/11/2022    CALCIUM 8.3 05/11/2022     Hepatic Function Panel:    Lab Results   Component Value Date    ALKPHOS 129 05/10/2022    ALT 48 05/10/2022    AST 60 05/10/2022    PROT 7.8 05/10/2022    BILITOT 0.7 05/10/2022    BILIDIR 0.4 05/10/2022    LABALBU 3.5 05/10/2022     Magnesium:    Lab Results   Component Value Date    MG 1.7 05/11/2022     PT/INR:    Lab Results   Component Value Date    INR 1.12 05/10/2022     Lipids:    Lab Results   Component Value Date    TRIG 73 03/07/2022    HDL 40 03/07/2022    LDLCALC 32 03/07/2022       ASSESSMENT AND PLAN:   The patient's condition/symptoms are unstable. Sent to ER        Diagnosis Orders   1.  Severe aortic stenosis       Continue:  GDMT:    ACE/ARB/ARNI - Entresto 24/26 BID              BB - Toprol 100 daily Diuretic - Lasix 40am 20pm  AA - caused bump in Cr  SGLT2 -  Farxiga  Vasodilator - hydralazine 50 Q 8               Other - ASA    HFrEF 35-40% improved to 50% w/ dobutamine stress  Severe AS    Pt sent directly to ER      Tolerating above noted HF meds, no ill side effects noted. Will continue to monitor kidney function and electrolytes. Will optimize as tolerated. Pt is compliant w/ medications. Total visit time of 20 minutes has been spent with patient on education of symptoms, management, medication, and plan of care; as well as review of chart: labs, ECHO, radiology reports, etc.   I personally spent more then 50% of the appt time face to face with the patient. · Daily weights  · Fluid restriction of 2 Liters per day  · Limit sodium in diet to around 4716-1000 mg/day  · Monitor BP  · Activity as tolerated     Patient was instructed to call the [facility] for any changes in symptoms as noted in AVS.      No follow-ups on file. or sooner if needed     Patient given educational materials - see patient instructions. We discussed the importance of weighing oneself and recording daily. We also discussed the importance of a low sodium diet, higher sodium foods to avoid and better low sodium food options. Patient verbalizes understanding of plan of care using teach back method, and is agreeable to the treatment plan.        Electronically signed by MEGHAN Greenwood CNP on 5/11/2022 at 7:50 AM

## 2022-12-16 NOTE — DISCHARGE NOTE PROVIDER - NSDCCPTREATMENT_GEN_ALL_CORE_FT
PRINCIPAL PROCEDURE  Procedure: Robot-assisted partial nephrectomy  Findings and Treatment:       SECONDARY PROCEDURE  Procedure: Right salpingectomy  Findings and Treatment:     Procedure: Exam under anesthesia, gynecologic  Findings and Treatment:

## 2022-12-16 NOTE — PROGRESS NOTE ADULT - ASSESSMENT
41yo F stable s/p robotic RT partial nephrectomy and salpingectomy  Post op dizziness- VS and CBC stable  Pain meds?  Plan:  ·	DC Fiore TOV  ·	DC CM drain  ·	OOB ambulating  ·	Monitor dizziness  ·	DC IV dilaudid    If feels better, tolerates regular diet for lunch plan home later today     KIM Edwrad

## 2022-12-16 NOTE — DISCHARGE NOTE NURSING/CASE MANAGEMENT/SOCIAL WORK - PATIENT PORTAL LINK FT
You can access the FollowMyHealth Patient Portal offered by [facility] by registering at the following website: [email or web address]. By joining [employer]’s FollowMyHealth portal, you will also be able to view your health information using other applications (apps) compatible with our system.

## 2022-12-16 NOTE — DISCHARGE NOTE PROVIDER - NSDCFUSCHEDAPPT_GEN_ALL_CORE_FT
Cherelle Montano  [facility]  [facility] [address]  Scheduled Appointment: 02/01/2023

## 2022-12-16 NOTE — DISCHARGE NOTE NURSING/CASE MANAGEMENT/SOCIAL WORK - NSDCPEFALRISK_GEN_ALL_CORE
For information on Fall & Injury Prevention, visit: [email or web address] OR  [email or web address] OR  https://www.cdc.gov/steadi/patient.html

## 2022-12-16 NOTE — DISCHARGE NOTE NURSING/CASE MANAGEMENT/SOCIAL WORK - NSDCVIVACCINE_GEN_ALL_CORE_FT
influenza, injectable, quadrivalent, preservative free; 11-Oct-2014 13:41; La Clayton (RN); [employer]; ui 192AB; IntraMuscular; Deltoid Left.; 0.5 milliLiter(s);

## 2022-12-16 NOTE — PROGRESS NOTE ADULT - SUBJECTIVE AND OBJECTIVE BOX
Progress Note- UROLOGY    POST OPERATIVE DAY #:       1     Status Post:  robotic assisted RT partial nephrectomy, RT salpingectomy    SUBJECTIVE:  States she is dizzy- no NV deepika clears, was OOB ambulating without difficulty earlier. Denied NV    MEDICATIONS  (STANDING):  heparin   Injectable 5000 Unit(s) SubCutaneous every 8 hours  sodium chloride 0.9%. 1000 milliLiter(s) (125 mL/Hr) IV Continuous <Continuous>    MEDICATIONS  (PRN):  HYDROmorphone  Injectable 0.5 milliGRAM(s) IV Push every 10 minutes PRN Severe Pain (7 - 10)  HYDROmorphone  Injectable 1 milliGRAM(s) IV Push every 4 hours PRN Severe Pain (7 - 10)  oxycodone    5 mG/acetaminophen 325 mG 1 Tablet(s) Oral every 4 hours PRN Mild Pain (1 - 3)  oxycodone    5 mG/acetaminophen 325 mG 2 Tablet(s) Oral every 6 hours PRN Moderate Pain (4 - 6)      Vital Signs Last 24 Hrs  T(C): 37.1 (16 Dec 2022 08:58), Max: 37.2 (15 Dec 2022 22:16)  T(F): 98.8 (16 Dec 2022 08:58), Max: 99 (15 Dec 2022 22:16)  HR: 81 (16 Dec 2022 08:58) (69 - 99)  BP: 106/62 (16 Dec 2022 08:58) (106/62 - 146/78)  BP(mean): 63 (16 Dec 2022 04:25) (63 - 78)  RR: 17 (16 Dec 2022 08:58) (12 - 17)  SpO2: 96% (16 Dec 2022 08:58) (96% - 100%)    Parameters below as of 16 Dec 2022 08:58  Patient On (Oxygen Delivery Method): room air        PHYSICAL EXAM:      Constitutional:  WDWN female in NAD    Respiratory:   CTA    Cardiovascular:   RR S1S2 nl no M    Gastrointestinal:   Post op wounds all C/D/I   CM RLQ serosanguinous   soft non distended with incisional tenderness.     Genitourinary:   Fiore in place clear yellow urine    Extremities:   SCD in place no CCE        I&O's Detail    15 Dec 2022 07:01  -  16 Dec 2022 07:00  --------------------------------------------------------  IN:    Other (mL): 1600 mL    sodium chloride 0.9%: 1500 mL  Total IN: 3100 mL    OUT:    Bulb (mL): 225 mL    Indwelling Catheter - Urethral (mL): 1855 mL  Total OUT: 2080 mL    Total NET: 1020 mL      16 Dec 2022 07:01  -  16 Dec 2022 09:30  --------------------------------------------------------  IN:  Total IN: 0 mL    OUT:    Indwelling Catheter - Urethral (mL): 325 mL  Total OUT: 325 mL    Total NET: -325 mL          LABS:                        12.2   12.69 )-----------( 233      ( 16 Dec 2022 05:34 )             36.3     12-16    140  |  109<H>  |  9   ----------------------------<  102<H>  3.9   |  25  |  0.58    Ca    8.1<L>      16 Dec 2022 05:34

## 2022-12-20 ENCOUNTER — EMERGENCY (EMERGENCY)
Facility: HOSPITAL | Age: 42
LOS: 0 days | Discharge: HOME | End: 2022-12-21
Attending: STUDENT IN AN ORGANIZED HEALTH CARE EDUCATION/TRAINING PROGRAM
Payer: COMMERCIAL

## 2022-12-20 VITALS — HEIGHT: 63 IN | WEIGHT: 149.91 LBS

## 2022-12-20 DIAGNOSIS — R29.898 OTHER SYMPTOMS AND SIGNS INVOLVING THE MUSCULOSKELETAL SYSTEM: ICD-10-CM

## 2022-12-20 DIAGNOSIS — R51.9 HEADACHE, UNSPECIFIED: ICD-10-CM

## 2022-12-20 DIAGNOSIS — Z98.890 OTHER SPECIFIED POSTPROCEDURAL STATES: Chronic | ICD-10-CM

## 2022-12-20 DIAGNOSIS — Z90.710 ACQUIRED ABSENCE OF BOTH CERVIX AND UTERUS: Chronic | ICD-10-CM

## 2022-12-20 DIAGNOSIS — Z98.891 HISTORY OF UTERINE SCAR FROM PREVIOUS SURGERY: Chronic | ICD-10-CM

## 2022-12-20 DIAGNOSIS — Z20.822 CONTACT WITH AND (SUSPECTED) EXPOSURE TO COVID-19: ICD-10-CM

## 2022-12-20 LAB
ALBUMIN SERPL ELPH-MCNC: 3.2 G/DL — LOW (ref 3.3–5)
ALP SERPL-CCNC: 60 U/L — SIGNIFICANT CHANGE UP (ref 40–120)
ALT FLD-CCNC: 52 U/L — SIGNIFICANT CHANGE UP (ref 12–78)
ANION GAP SERPL CALC-SCNC: 9 MMOL/L — SIGNIFICANT CHANGE UP (ref 5–17)
APPEARANCE UR: CLEAR — SIGNIFICANT CHANGE UP
APTT BLD: 32.5 SEC — SIGNIFICANT CHANGE UP (ref 27.5–35.5)
AST SERPL-CCNC: 23 U/L — SIGNIFICANT CHANGE UP (ref 15–37)
BASOPHILS # BLD AUTO: 0.03 K/UL — SIGNIFICANT CHANGE UP (ref 0–0.2)
BASOPHILS NFR BLD AUTO: 0.4 % — SIGNIFICANT CHANGE UP (ref 0–2)
BILIRUB SERPL-MCNC: 0.3 MG/DL — SIGNIFICANT CHANGE UP (ref 0.2–1.2)
BILIRUB UR-MCNC: NEGATIVE — SIGNIFICANT CHANGE UP
BUN SERPL-MCNC: 8 MG/DL — SIGNIFICANT CHANGE UP (ref 7–23)
CALCIUM SERPL-MCNC: 8.8 MG/DL — SIGNIFICANT CHANGE UP (ref 8.5–10.1)
CHLORIDE SERPL-SCNC: 104 MMOL/L — SIGNIFICANT CHANGE UP (ref 96–108)
CO2 SERPL-SCNC: 27 MMOL/L — SIGNIFICANT CHANGE UP (ref 22–31)
COLOR SPEC: YELLOW — SIGNIFICANT CHANGE UP
CREAT SERPL-MCNC: 0.56 MG/DL — SIGNIFICANT CHANGE UP (ref 0.5–1.3)
DIFF PNL FLD: ABNORMAL
EGFR: 118 ML/MIN/1.73M2 — SIGNIFICANT CHANGE UP
EOSINOPHIL # BLD AUTO: 0.15 K/UL — SIGNIFICANT CHANGE UP (ref 0–0.5)
EOSINOPHIL NFR BLD AUTO: 1.8 % — SIGNIFICANT CHANGE UP (ref 0–6)
FLUAV AG NPH QL: SIGNIFICANT CHANGE UP
FLUBV AG NPH QL: SIGNIFICANT CHANGE UP
GLUCOSE SERPL-MCNC: 96 MG/DL — SIGNIFICANT CHANGE UP (ref 70–99)
GLUCOSE UR QL: NEGATIVE — SIGNIFICANT CHANGE UP
HCG SERPL-ACNC: <1 MIU/ML — SIGNIFICANT CHANGE UP
HCT VFR BLD CALC: 41 % — SIGNIFICANT CHANGE UP (ref 34.5–45)
HGB BLD-MCNC: 13.6 G/DL — SIGNIFICANT CHANGE UP (ref 11.5–15.5)
IMM GRANULOCYTES NFR BLD AUTO: 0.4 % — SIGNIFICANT CHANGE UP (ref 0–0.9)
INR BLD: 1.05 RATIO — SIGNIFICANT CHANGE UP (ref 0.88–1.16)
KETONES UR-MCNC: NEGATIVE — SIGNIFICANT CHANGE UP
LEUKOCYTE ESTERASE UR-ACNC: NEGATIVE — SIGNIFICANT CHANGE UP
LYMPHOCYTES # BLD AUTO: 1.57 K/UL — SIGNIFICANT CHANGE UP (ref 1–3.3)
LYMPHOCYTES # BLD AUTO: 19 % — SIGNIFICANT CHANGE UP (ref 13–44)
MCHC RBC-ENTMCNC: 30.3 PG — SIGNIFICANT CHANGE UP (ref 27–34)
MCHC RBC-ENTMCNC: 33.2 GM/DL — SIGNIFICANT CHANGE UP (ref 32–36)
MCV RBC AUTO: 91.3 FL — SIGNIFICANT CHANGE UP (ref 80–100)
MONOCYTES # BLD AUTO: 0.54 K/UL — SIGNIFICANT CHANGE UP (ref 0–0.9)
MONOCYTES NFR BLD AUTO: 6.5 % — SIGNIFICANT CHANGE UP (ref 2–14)
NEUTROPHILS # BLD AUTO: 5.94 K/UL — SIGNIFICANT CHANGE UP (ref 1.8–7.4)
NEUTROPHILS NFR BLD AUTO: 71.9 % — SIGNIFICANT CHANGE UP (ref 43–77)
NITRITE UR-MCNC: NEGATIVE — SIGNIFICANT CHANGE UP
PH UR: 8 — SIGNIFICANT CHANGE UP (ref 5–8)
PLATELET # BLD AUTO: 328 K/UL — SIGNIFICANT CHANGE UP (ref 150–400)
POTASSIUM SERPL-MCNC: 3.9 MMOL/L — SIGNIFICANT CHANGE UP (ref 3.5–5.3)
POTASSIUM SERPL-SCNC: 3.9 MMOL/L — SIGNIFICANT CHANGE UP (ref 3.5–5.3)
PROT SERPL-MCNC: 7.6 GM/DL — SIGNIFICANT CHANGE UP (ref 6–8.3)
PROT UR-MCNC: NEGATIVE — SIGNIFICANT CHANGE UP
PROTHROM AB SERPL-ACNC: 12.2 SEC — SIGNIFICANT CHANGE UP (ref 10.5–13.4)
RBC # BLD: 4.49 M/UL — SIGNIFICANT CHANGE UP (ref 3.8–5.2)
RBC # FLD: 13.2 % — SIGNIFICANT CHANGE UP (ref 10.3–14.5)
RSV RNA NPH QL NAA+NON-PROBE: SIGNIFICANT CHANGE UP
SARS-COV-2 N GENE NPH QL NAA+PROBE: NOT DETECTED
SARS-COV-2 RNA SPEC QL NAA+PROBE: SIGNIFICANT CHANGE UP
SODIUM SERPL-SCNC: 140 MMOL/L — SIGNIFICANT CHANGE UP (ref 135–145)
SP GR SPEC: 1.01 — SIGNIFICANT CHANGE UP (ref 1.01–1.02)
TROPONIN I, HIGH SENSITIVITY RESULT: 3.19 NG/L — SIGNIFICANT CHANGE UP
UROBILINOGEN FLD QL: NEGATIVE — SIGNIFICANT CHANGE UP
WBC # BLD: 8.26 K/UL — SIGNIFICANT CHANGE UP (ref 3.8–10.5)
WBC # FLD AUTO: 8.26 K/UL — SIGNIFICANT CHANGE UP (ref 3.8–10.5)

## 2022-12-20 PROCEDURE — 99284 EMERGENCY DEPT VISIT MOD MDM: CPT | Mod: 25

## 2022-12-20 PROCEDURE — 81001 URINALYSIS AUTO W/SCOPE: CPT

## 2022-12-20 PROCEDURE — 72125 CT NECK SPINE W/O DYE: CPT | Mod: MG

## 2022-12-20 PROCEDURE — 70496 CT ANGIOGRAPHY HEAD: CPT | Mod: 26,MA

## 2022-12-20 PROCEDURE — 36415 COLL VENOUS BLD VENIPUNCTURE: CPT

## 2022-12-20 PROCEDURE — G1004: CPT

## 2022-12-20 PROCEDURE — 76998 US GUIDE INTRAOP: CPT | Mod: 26,RT

## 2022-12-20 PROCEDURE — 70450 CT HEAD/BRAIN W/O DYE: CPT | Mod: MG

## 2022-12-20 PROCEDURE — 70450 CT HEAD/BRAIN W/O DYE: CPT | Mod: 26,MG,59

## 2022-12-20 PROCEDURE — 84484 ASSAY OF TROPONIN QUANT: CPT

## 2022-12-20 PROCEDURE — 72125 CT NECK SPINE W/O DYE: CPT | Mod: 26,MG

## 2022-12-20 PROCEDURE — 71275 CT ANGIOGRAPHY CHEST: CPT | Mod: 26,MA

## 2022-12-20 PROCEDURE — 85025 COMPLETE CBC W/AUTO DIFF WBC: CPT

## 2022-12-20 PROCEDURE — 50543 LAPARO PARTIAL NEPHRECTOMY: CPT | Mod: RT

## 2022-12-20 PROCEDURE — 96375 TX/PRO/DX INJ NEW DRUG ADDON: CPT | Mod: XU

## 2022-12-20 PROCEDURE — 85730 THROMBOPLASTIN TIME PARTIAL: CPT

## 2022-12-20 PROCEDURE — 86901 BLOOD TYPING SEROLOGIC RH(D): CPT

## 2022-12-20 PROCEDURE — 80053 COMPREHEN METABOLIC PANEL: CPT

## 2022-12-20 PROCEDURE — 99285 EMERGENCY DEPT VISIT HI MDM: CPT

## 2022-12-20 PROCEDURE — 70496 CT ANGIOGRAPHY HEAD: CPT | Mod: MA

## 2022-12-20 PROCEDURE — 96374 THER/PROPH/DIAG INJ IV PUSH: CPT | Mod: XU

## 2022-12-20 PROCEDURE — 86900 BLOOD TYPING SEROLOGIC ABO: CPT

## 2022-12-20 PROCEDURE — 86850 RBC ANTIBODY SCREEN: CPT

## 2022-12-20 PROCEDURE — 84702 CHORIONIC GONADOTROPIN TEST: CPT

## 2022-12-20 PROCEDURE — 71275 CT ANGIOGRAPHY CHEST: CPT | Mod: MA

## 2022-12-20 PROCEDURE — 85610 PROTHROMBIN TIME: CPT

## 2022-12-20 PROCEDURE — 0241U: CPT

## 2022-12-20 RX ORDER — DIPHENHYDRAMINE HCL 50 MG
25 CAPSULE ORAL ONCE
Refills: 0 | Status: COMPLETED | OUTPATIENT
Start: 2022-12-20 | End: 2022-12-20

## 2022-12-20 RX ORDER — METOCLOPRAMIDE HCL 10 MG
10 TABLET ORAL ONCE
Refills: 0 | Status: COMPLETED | OUTPATIENT
Start: 2022-12-20 | End: 2022-12-20

## 2022-12-20 RX ORDER — SODIUM CHLORIDE 9 MG/ML
3 INJECTION INTRAMUSCULAR; INTRAVENOUS; SUBCUTANEOUS ONCE
Refills: 0 | Status: COMPLETED | OUTPATIENT
Start: 2022-12-20 | End: 2022-12-20

## 2022-12-20 RX ADMIN — Medication 10 MILLIGRAM(S): at 21:28

## 2022-12-20 RX ADMIN — Medication 25 MILLIGRAM(S): at 21:27

## 2022-12-20 RX ADMIN — SODIUM CHLORIDE 3 MILLILITER(S): 9 INJECTION INTRAMUSCULAR; INTRAVENOUS; SUBCUTANEOUS at 19:35

## 2022-12-20 NOTE — ED PROVIDER NOTE - PROGRESS NOTE DETAILS
Patient has been ambulatory while in the ED here.  She is pending CT scan, urinalysis.  Joni Tyler MD. Patient reassessed, has been ambulatory in the emergency department.  States that she is feeling better and was mainly concerned for her head.  In regards for her weakness, she does state it improves when she is resting.  I discussed possibility of keeping her in the ED for further testing though she would rather go home if imaging is unremarkable here.  Given no red flag symptoms, lower suspicion for spinal cord pathology at this time.  Patient states she feels weak and tired, suspect this is more likely related to recent surgery deconditioning rather than acute spinal cord pathology.  Joni Tyler MD. pt and family told of results.   agrees with plan for d/c home and will f/u

## 2022-12-20 NOTE — ED PROVIDER NOTE - NSFOLLOWUPINSTRUCTIONS_ED_ALL_ED_FT
please follow up with your doctors in 2-3 days.   continue with your medications as prescribed by your doctor.   drink plenty of fluids.   return to ED for any concerns

## 2022-12-20 NOTE — ED PROVIDER NOTE - PHYSICAL EXAMINATION
Constitutional: Awake, Alert, non-toxic. No acute distress. Well appearing, well nourished.   HEAD: Normocephalic, atraumatic.   EYES: PERRL, EOM intact, conjunctiva and sclera are clear bilaterally.  ENT: External ears normal. No rhinorrhea, no tracheal deviation   NECK: Supple, non-tender  CARDIOVASCULAR: regular rate and rhythm.  RESPIRATORY: Normal respiratory effort; breath sounds CTAB, no wheezes, rhonchi, or rales. Speaking in full sentences. No accessory muscle use.   ABDOMEN: Soft; non-tender, non-distended. No rebound or guarding.   EXTREMITIES:  no lower extremity edema, no deformities  SKIN: Warm, dry  NEURO: A&O x3. Speech is normal. No facial droop. 4/5 strength in b/l lower extremities. 5/5 strength in b/l upper extremities. CN 2-12 in tact. No facial droop. Normal finger to nose. Negative pronator drift.   PSYCH: Appearance and judgement seem appropriate for gender and age.

## 2022-12-20 NOTE — ED ADULT TRIAGE NOTE - CCCP TRG CHIEF CMPLNT
Received authorization for oxycodone HCL-ER 80 MG good 2/11/22  thru 12/31/22.  Ayesha Lloyd LPN headache

## 2022-12-20 NOTE — ED ADULT NURSE NOTE - OBJECTIVE STATEMENT
s/p  surgery tumor removal from kidney done Thursday   December 15, 2022  Patient having a headache when she tries to sleep that feels like electric shock, weakness in her legs.

## 2022-12-20 NOTE — ED ADULT NURSE NOTE - NSIMPLEMENTINTERV_GEN_ALL_ED
Implemented All Fall with Harm Risk Interventions:  [place] to call system. Call bell, personal items and telephone within reach. Instruct patient to call for assistance. Room bathroom lighting operational. Non-slip footwear when patient is off stretcher. Physically safe environment: no spills, clutter or unnecessary equipment. Stretcher in lowest position, wheels locked, appropriate side rails in place. Provide visual cue, wrist band, yellow gown, etc. Monitor gait and stability. Monitor for mental status changes and reorient to person, place, and time. Review medications for side effects contributing to fall risk. Reinforce activity limits and safety measures with patient and family. Provide visual clues: red socks.

## 2022-12-20 NOTE — ED ADULT TRIAGE NOTE - CHIEF COMPLAINT QUOTE
pt ambulatory to triage with  c/o headache since Friday. last Thursday pt has R salpingectomy at . -allergies

## 2022-12-20 NOTE — ED ADULT NURSE REASSESSMENT NOTE - NS ED NURSE REASSESS COMMENT FT1
Assumed care of patient at this time, report received from Sherin BRAN. Pt awaiting urinarylsis results. pt educated on plan of care, pt able to successfully teach back plan of care to RN, RN will continue to reeducate pt during hospital stay.

## 2022-12-20 NOTE — ED PROVIDER NOTE - CLINICAL SUMMARY MEDICAL DECISION MAKING FREE TEXT BOX
Concern at this time possible cerebral venous thrombosis given recent surgery and that with headache and neuro findings.  Less likely stroke still possibility.  I feel this is less likely though with bilateral symptoms.  She has no back pain, saddle anesthesia to suggest spinal cord pathology.  We will check for renal dysfunction versus flu versus COVID as well.  We will plan to reevaluate patient pending work-up for disposition.

## 2022-12-20 NOTE — ED PROVIDER NOTE - OBJECTIVE STATEMENT
Patient with past medical history of recent partial nephrectomy for renal mass as well as salpingo-oophorectomy 5 days ago is presenting for headache and lower extremity weakness.  Symptoms have been present for the past 3 days.  Has been intermittent.  Worse when lying down at night.  Nothing otherwise has made her feel better.  Has had trouble ambulating during this time.  No fevers, neck pain or neck stiffness.  No bowel or bladder incontinence.  No back pain.  No saddle anesthesia. no hx dvt/pe.

## 2022-12-20 NOTE — ED STATDOCS - PROGRESS NOTE DETAILS
Socorro Saeed for attending Dr. Lopes: 43 y/o F with a PSHx of robot-assisted partial nephrectomy for renal mass by Dr. Faulkner, and R salpingectomy for ovarian torsion with hydrosalpinx by Kush Henson, both on 12/15 d/c'ed on tylenol and oxycodone presents to the ED c/o HA. pt describes HA as an "electric shock" sensation in the back of the head, top of head, and behind neck. reports having palpitations. States having difficulty ambulating without cane or assistance secondary to b/l knee pain, which the pt and family states is abnormal compared to before the surgery. pt has a f/u appointment with surgeons on 12/30. Endorses some photosensitivity, no photophobia, for 5 days since 12/15. Denies fever. PCP: Dr. Gordon. Will send pt to main ED for further evaluation. Socorro Saeed for attending Dr. Lopes: 41 y/o F with a PSHx of robot-assisted partial nephrectomy for renal mass by Dr. Faulkner, and R salpingectomy for ovarian torsion with hydrosalpinx by Kush Henson, both on 12/15 d/c'ed on tylenol and oxycodone presents to the ED bib  c/o HA, neck discmofort R LE weakness since evening of d/c, persistent no fever, positive mild R LE weakness, otherwise, normal speech, CN 2-12 intact, 4-/5 strength in R LE. pt to go to main ED for further evaluation of apparent new neuro complaints, although out of window for code stroke. Plan: urine, CT, monitor observe, and reassess. Socorro Saeed for attending Dr. oLpes: 41 y/o F with a PSHx of robot-assisted partial nephrectomy for renal mass by Dr. Faulkner, and R salpingectomy for ovarian torsion with hydrosalpinx by Kush Henson, both on 12/15 d/c'ed on tylenol and oxycodone presents to the ED bib  c/o HA, neck discomfort, R LE weakness since evening of d/c, persistent, no fever. Exam: positive mild R LE weakness, otherwise, normal speech, CN 2-12 intact, 4-/5 strength in R LE. pt to go to main ED for further evaluation of apparent new neuro complaints, although out of window for code stroke. Plan: urine, CT, monitor observe, and reassess.

## 2022-12-20 NOTE — ED PROVIDER NOTE - PATIENT PORTAL LINK FT
You can access the FollowMyHealth Patient Portal offered by [facility] by registering at the following website: [email or web address]. By joining [employer]’s FollowMyHealth portal, you will also be able to view your health information using other applications (apps) compatible with our system.

## 2022-12-21 VITALS
OXYGEN SATURATION: 100 % | TEMPERATURE: 99 F | DIASTOLIC BLOOD PRESSURE: 84 MMHG | RESPIRATION RATE: 18 BRPM | SYSTOLIC BLOOD PRESSURE: 127 MMHG | HEART RATE: 90 BPM

## 2022-12-21 PROBLEM — N39.0 URINARY TRACT INFECTION, SITE NOT SPECIFIED: Chronic | Status: ACTIVE | Noted: 2022-12-08

## 2022-12-21 PROBLEM — N28.89 OTHER SPECIFIED DISORDERS OF KIDNEY AND URETER: Chronic | Status: ACTIVE | Noted: 2022-12-08

## 2022-12-22 ENCOUNTER — NON-APPOINTMENT (OUTPATIENT)
Age: 42
End: 2022-12-22

## 2022-12-30 ENCOUNTER — APPOINTMENT (OUTPATIENT)
Dept: UROLOGY | Facility: CLINIC | Age: 42
End: 2022-12-30

## 2023-01-04 ENCOUNTER — APPOINTMENT (OUTPATIENT)
Dept: UROLOGY | Facility: CLINIC | Age: 43
End: 2023-01-04
Payer: COMMERCIAL

## 2023-01-04 PROCEDURE — 99024 POSTOP FOLLOW-UP VISIT: CPT

## 2023-01-08 NOTE — PHYSICAL EXAM
[Normal Appearance] : normal appearance [Well Groomed] : well groomed [General Appearance - In No Acute Distress] : no acute distress [Abdomen Soft] : soft [Urethral Meatus] : the meatus of the urethra showed no abnormalities [Skin Color & Pigmentation] : normal skin color and pigmentation [Edema] : no peripheral edema [Oriented To Time, Place, And Person] : oriented to person, place, and time [Affect] : the affect was normal [Normal Station and Gait] : the gait and station were normal for the patient's age [No Focal Deficits] : no focal deficits [No Palpable Adenopathy] : no palpable adenopathy [] : no respiratory distress [Exaggerated Use Of Accessory Muscles For Inspiration] : no accessory muscle use [FreeTextEntry1] : trochar incisions w/o infection

## 2023-01-08 NOTE — ASSESSMENT
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 \par Repeat CT abdomen and pelvis in 4 months.\par Next follow-up appointment in 4 months.

## 2023-01-08 NOTE — HISTORY OF PRESENT ILLNESS
[None] : no symptoms [FreeTextEntry1] : 43 yo presents today for a POA 2 weeks s/o right partial nephrectomy 12.5.22.\par Pt. is doing well since her surgery and had no complications. \par Here to review her pathology report. \par Right fallopian tube was removed intraoperatively by Dr. Henson. \par She is following up with her GYN on 2.1.22.

## 2023-02-01 ENCOUNTER — APPOINTMENT (OUTPATIENT)
Dept: OBGYN | Facility: CLINIC | Age: 43
End: 2023-02-01
Payer: COMMERCIAL

## 2023-02-01 VITALS
BODY MASS INDEX: 26.22 KG/M2 | DIASTOLIC BLOOD PRESSURE: 72 MMHG | WEIGHT: 148 LBS | HEIGHT: 63 IN | SYSTOLIC BLOOD PRESSURE: 122 MMHG

## 2023-02-01 DIAGNOSIS — Z85.528 PERSONAL HISTORY OF OTHER MALIGNANT NEOPLASM OF KIDNEY: ICD-10-CM

## 2023-02-01 DIAGNOSIS — Z12.39 ENCOUNTER FOR OTHER SCREENING FOR MALIGNANT NEOPLASM OF BREAST: ICD-10-CM

## 2023-02-01 DIAGNOSIS — Z00.00 ENCOUNTER FOR GENERAL ADULT MEDICAL EXAMINATION W/OUT ABNORMAL FINDINGS: ICD-10-CM

## 2023-02-01 LAB
BILIRUB UR QL STRIP: NORMAL
CLARITY UR: CLEAR
COLLECTION METHOD: NORMAL
GLUCOSE UR-MCNC: NORMAL
HCG UR QL: 0.2 EU/DL
HGB UR QL STRIP.AUTO: ABNORMAL
KETONES UR-MCNC: ABNORMAL
LEUKOCYTE ESTERASE UR QL STRIP: NORMAL
NITRITE UR QL STRIP: NORMAL
PH UR STRIP: 6
PROT UR STRIP-MCNC: NORMAL
SP GR UR STRIP: 1.02

## 2023-02-01 PROCEDURE — 81003 URINALYSIS AUTO W/O SCOPE: CPT | Mod: QW

## 2023-02-01 PROCEDURE — 99396 PREV VISIT EST AGE 40-64: CPT

## 2023-02-01 RX ORDER — NORETHINDRONE ACETATE AND ETHINYL ESTRADIOL AND FERROUS FUMARATE 1MG-20(21)
1-20 KIT ORAL DAILY
Qty: 3 | Refills: 3 | Status: DISCONTINUED | COMMUNITY
Start: 2021-02-01 | End: 2023-02-01

## 2023-02-01 NOTE — HISTORY OF PRESENT ILLNESS
[Patient reported mammogram was normal] : Patient reported mammogram was normal [Patient reported PAP Smear was normal] : Patient reported PAP Smear was normal [HIV test declined] : HIV test declined [Syphilis test declined] : Syphilis test declined [Hepatitis B test declined] : Hepatitis B test declined [Hepatitis C test declined] : Hepatitis C test declined [Monogamous (Male Partner)] : is monogamous with a male partner [Y] : Positive pregnancy history [TextBox_4] : 41 yo here for annual. Had right partial nephrectomy 12/2022 with stage I RCC, has follow up in 4 months. Also had right salingectomy by Dr. Henson via intraoperative consult found to have right adnexal torsion and removed, benign. LLQ pain has resolved. \par \par c/o dark urine. Udip tr blood tr ketone [Mammogramdate] : 2022 [TextBox_19] : referral [PapSmeardate] : 2020 [TextBox_31] : HPV neg [LMPDate] : 2018 [PGHxTotal] : 3 [[facility]] : 2 [PGxPremature] : 1 [[facility] Hospitaliving] : 3 [FreeTextEntry1] : 2009: , C/S, M 5lb. 2011: FT, C/S, M, 7lb. 2014: FT, C/S, F, 8lb. Denies hx of cysts, fibroids, abnormla pap, STI

## 2023-02-01 NOTE — DISCUSSION/SUMMARY
[FreeTextEntry1] : 43 yo here for annual. \par 1) health maintenance: \par Pap + HPV\par Mammogram referral\par Declines STI testing\par \par 2) Dark urine:\par tr blood, possibly due to recent nephrectomy? \par UA/UCx sent. \par \par 3) Vaginal discharge:\par Affirm, will contact with results

## 2023-02-01 NOTE — PHYSICAL EXAM
[Appropriately responsive] : appropriately responsive [Alert] : alert [No Acute Distress] : no acute distress [Oriented x3] : oriented x3 [Chaperone Present] : A chaperone was present in the examining room during all aspects of the physical examination [No Lymphadenopathy] : no lymphadenopathy [Soft] : soft [Non-tender] : non-tender [Non-distended] : non-distended [No HSM] : No HSM [No Lesions] : no lesions [No Mass] : no mass [Labia Majora] : normal [Labia Minora] : normal [Normal] : normal [Uterine Adnexae] : normal [FreeTextEntry1] : FLORIAN Sheehan [FreeTextEntry7] : right sided incisions well-healed [FreeTextEntry4] : brown discharge small amount [FreeTextEntry5] : cervix present, no lesions [FreeTextEntry6] : uterus absent.

## 2023-02-02 LAB
APPEARANCE: CLEAR
BACTERIA: NEGATIVE
BILIRUBIN URINE: NEGATIVE
BLOOD URINE: NEGATIVE
CANDIDA VAG CYTO: NOT DETECTED
COLOR: YELLOW
G VAGINALIS+PREV SP MTYP VAG QL MICRO: NOT DETECTED
GLUCOSE QUALITATIVE U: NEGATIVE
HPV HIGH+LOW RISK DNA PNL CVX: NOT DETECTED
HYALINE CASTS: 3 /LPF
KETONES URINE: NEGATIVE
LEUKOCYTE ESTERASE URINE: NEGATIVE
MICROSCOPIC-UA: NORMAL
NITRITE URINE: NEGATIVE
PH URINE: 6
PROTEIN URINE: NORMAL
RED BLOOD CELLS URINE: 7 /HPF
SPECIFIC GRAVITY URINE: 1.02
SQUAMOUS EPITHELIAL CELLS: 4 /HPF
T VAGINALIS VAG QL WET PREP: NOT DETECTED
UROBILINOGEN URINE: NORMAL
WHITE BLOOD CELLS URINE: 1 /HPF

## 2023-02-03 ENCOUNTER — NON-APPOINTMENT (OUTPATIENT)
Age: 43
End: 2023-02-03

## 2023-02-03 LAB — BACTERIA UR CULT: NORMAL

## 2023-02-06 LAB — CYTOLOGY CVX/VAG DOC THIN PREP: NORMAL

## 2023-04-04 ENCOUNTER — RESULT REVIEW (OUTPATIENT)
Age: 43
End: 2023-04-04

## 2023-05-02 ENCOUNTER — OUTPATIENT (OUTPATIENT)
Dept: OUTPATIENT SERVICES | Facility: HOSPITAL | Age: 43
LOS: 1 days | End: 2023-05-02
Payer: COMMERCIAL

## 2023-05-02 ENCOUNTER — APPOINTMENT (OUTPATIENT)
Dept: CT IMAGING | Facility: CLINIC | Age: 43
End: 2023-05-02
Payer: COMMERCIAL

## 2023-05-02 DIAGNOSIS — Z98.890 OTHER SPECIFIED POSTPROCEDURAL STATES: Chronic | ICD-10-CM

## 2023-05-02 DIAGNOSIS — Z98.891 HISTORY OF UTERINE SCAR FROM PREVIOUS SURGERY: Chronic | ICD-10-CM

## 2023-05-02 DIAGNOSIS — C64.9 MALIGNANT NEOPLASM OF UNSPECIFIED KIDNEY, EXCEPT RENAL PELVIS: ICD-10-CM

## 2023-05-02 DIAGNOSIS — Z90.710 ACQUIRED ABSENCE OF BOTH CERVIX AND UTERUS: Chronic | ICD-10-CM

## 2023-05-02 PROCEDURE — 74178 CT ABD&PLV WO CNTR FLWD CNTR: CPT

## 2023-05-02 PROCEDURE — 74178 CT ABD&PLV WO CNTR FLWD CNTR: CPT | Mod: 26

## 2023-05-22 ENCOUNTER — APPOINTMENT (OUTPATIENT)
Dept: UROLOGY | Facility: CLINIC | Age: 43
End: 2023-05-22
Payer: COMMERCIAL

## 2023-05-22 VITALS
HEART RATE: 77 BPM | OXYGEN SATURATION: 99 % | RESPIRATION RATE: 18 BRPM | SYSTOLIC BLOOD PRESSURE: 138 MMHG | DIASTOLIC BLOOD PRESSURE: 85 MMHG

## 2023-05-22 PROCEDURE — 99214 OFFICE O/P EST MOD 30 MIN: CPT

## 2023-08-28 ENCOUNTER — NON-APPOINTMENT (OUTPATIENT)
Age: 43
End: 2023-08-28

## 2023-08-28 ENCOUNTER — APPOINTMENT (OUTPATIENT)
Dept: CT IMAGING | Facility: CLINIC | Age: 43
End: 2023-08-28
Payer: COMMERCIAL

## 2023-08-28 ENCOUNTER — OUTPATIENT (OUTPATIENT)
Dept: OUTPATIENT SERVICES | Facility: HOSPITAL | Age: 43
LOS: 1 days | End: 2023-08-28
Payer: COMMERCIAL

## 2023-08-28 DIAGNOSIS — C64.9 MALIGNANT NEOPLASM OF UNSPECIFIED KIDNEY, EXCEPT RENAL PELVIS: ICD-10-CM

## 2023-08-28 DIAGNOSIS — Z90.710 ACQUIRED ABSENCE OF BOTH CERVIX AND UTERUS: Chronic | ICD-10-CM

## 2023-08-28 DIAGNOSIS — Z00.8 ENCOUNTER FOR OTHER GENERAL EXAMINATION: ICD-10-CM

## 2023-08-28 PROCEDURE — 74170 CT ABD WO CNTRST FLWD CNTRST: CPT | Mod: 26

## 2023-08-28 PROCEDURE — 74170 CT ABD WO CNTRST FLWD CNTRST: CPT

## 2023-09-11 ENCOUNTER — APPOINTMENT (OUTPATIENT)
Dept: UROLOGY | Facility: CLINIC | Age: 43
End: 2023-09-11
Payer: COMMERCIAL

## 2023-09-11 PROCEDURE — 99213 OFFICE O/P EST LOW 20 MIN: CPT

## 2024-02-07 DIAGNOSIS — Z12.39 ENCOUNTER FOR OTHER SCREENING FOR MALIGNANT NEOPLASM OF BREAST: ICD-10-CM

## 2024-02-07 DIAGNOSIS — Z01.419 ENCOUNTER FOR GYNECOLOGICAL EXAMINATION (GENERAL) (ROUTINE) W/OUT ABNORMAL FINDINGS: ICD-10-CM

## 2024-02-08 ENCOUNTER — APPOINTMENT (OUTPATIENT)
Dept: OBGYN | Facility: CLINIC | Age: 44
End: 2024-02-08
Payer: MEDICAID

## 2024-02-08 VITALS
DIASTOLIC BLOOD PRESSURE: 80 MMHG | WEIGHT: 150 LBS | SYSTOLIC BLOOD PRESSURE: 124 MMHG | BODY MASS INDEX: 26.58 KG/M2 | HEIGHT: 63 IN

## 2024-02-08 DIAGNOSIS — Z01.419 ENCOUNTER FOR GYNECOLOGICAL EXAMINATION (GENERAL) (ROUTINE) W/OUT ABNORMAL FINDINGS: ICD-10-CM

## 2024-02-08 DIAGNOSIS — N64.3 GALACTORRHEA NOT ASSOCIATED WITH CHILDBIRTH: ICD-10-CM

## 2024-02-08 DIAGNOSIS — N28.89 OTHER SPECIFIED DISORDERS OF KIDNEY AND URETER: ICD-10-CM

## 2024-02-08 DIAGNOSIS — Z01.411 ENCOUNTER FOR GYNECOLOGICAL EXAMINATION (GENERAL) (ROUTINE) WITH ABNORMAL FINDINGS: ICD-10-CM

## 2024-02-08 DIAGNOSIS — R82.90 UNSPECIFIED ABNORMAL FINDINGS IN URINE: ICD-10-CM

## 2024-02-08 DIAGNOSIS — R10.2 PELVIC AND PERINEAL PAIN: ICD-10-CM

## 2024-02-08 DIAGNOSIS — N83.202 UNSPECIFIED OVARIAN CYST, LEFT SIDE: ICD-10-CM

## 2024-02-08 DIAGNOSIS — Z87.42 PERSONAL HISTORY OF OTHER DISEASES OF THE FEMALE GENITAL TRACT: ICD-10-CM

## 2024-02-08 DIAGNOSIS — N83.201 UNSPECIFIED OVARIAN CYST, RIGHT SIDE: ICD-10-CM

## 2024-02-08 DIAGNOSIS — Z85.528 PERSONAL HISTORY OF OTHER MALIGNANT NEOPLASM OF KIDNEY: ICD-10-CM

## 2024-02-08 DIAGNOSIS — N83.291 OTHER OVARIAN CYST, RIGHT SIDE: ICD-10-CM

## 2024-02-08 LAB
BILIRUB UR QL STRIP: NORMAL
CLARITY UR: CLEAR
COLLECTION METHOD: NORMAL
GLUCOSE UR-MCNC: NORMAL
HCG UR QL: 0.2 EU/DL
HGB UR QL STRIP.AUTO: NORMAL
KETONES UR-MCNC: NORMAL
LEUKOCYTE ESTERASE UR QL STRIP: NORMAL
NITRITE UR QL STRIP: NORMAL
PH UR STRIP: 5.5
PROT UR STRIP-MCNC: NORMAL
SP GR UR STRIP: 1.03

## 2024-02-08 PROCEDURE — 99396 PREV VISIT EST AGE 40-64: CPT | Mod: 25

## 2024-02-08 PROCEDURE — 81003 URINALYSIS AUTO W/O SCOPE: CPT | Mod: QW

## 2024-02-08 PROCEDURE — 99459 PELVIC EXAMINATION: CPT | Mod: 25

## 2024-02-08 NOTE — HISTORY OF PRESENT ILLNESS
[Patient reported mammogram was normal] : Patient reported mammogram was normal [Patient reported PAP Smear was normal] : Patient reported PAP Smear was normal [FreeTextEntry1] : Kirstin is a 43-year-old coming in for annual exam. History of stable paraovarian cyst as well as stable hydrosalpinx. History of partial nephrectomy with stage I renal cell carcinoma, during the procedure she was found to have right adnexal torsion and had right salpingectomy. Reports she was in [facility] several days ago and was told she has a right adnexal mass and that she needs a pelvic US. LMP in 2018, then she had hysterectomy for AUB FMP age 12. Last PAP 1 year ago, negative.  Denies history of abnormal Pap smear. Sexually active No history of STDs. , CSX3 Mammogram - mammogram with benign stable findings in 2023 PMH - RCC, stage 1.  PSH - Hysterectomy, CSx3, partial nephrectomy, hernia repair.  Medications - none Allergies -CT contrast  No smoking or drug use, ETOH - none Family - none.   [Mammogramdate] : 04/23 [PapSmeardate] : 02/23

## 2024-02-08 NOTE — PHYSICAL EXAM
[Chaperone Present] : A chaperone was present in the examining room during all aspects of the physical examination [FreeTextEntry1] :  Anila Cuello [Appropriately responsive] : appropriately responsive [Alert] : alert [No Acute Distress] : no acute distress [Soft] : soft [Non-tender] : non-tender [Non-distended] : non-distended [Oriented x3] : oriented x3 [Examination Of The Breasts] : a normal appearance [No Masses] : no breast masses were palpable [Labia Majora] : normal [Labia Minora] : normal [Normal] : normal [Absent] : absent [Uterine Adnexae] : non-palpable

## 2024-02-08 NOTE — DISCUSSION/SUMMARY
[FreeTextEntry1] :  Kirstin is coming in for annual exam. Exam as above. Pap done. Mammogram referral given. Given finding on CT scan of right ovarian cyst, referral for pelvic and transvaginal ultrasound was given. Follow-up with results.

## 2024-02-12 LAB — HPV HIGH+LOW RISK DNA PNL CVX: NOT DETECTED

## 2024-02-15 ENCOUNTER — APPOINTMENT (OUTPATIENT)
Dept: ULTRASOUND IMAGING | Facility: CLINIC | Age: 44
End: 2024-02-15
Payer: MEDICAID

## 2024-02-15 ENCOUNTER — OUTPATIENT (OUTPATIENT)
Dept: OUTPATIENT SERVICES | Facility: HOSPITAL | Age: 44
LOS: 1 days | End: 2024-02-15
Payer: COMMERCIAL

## 2024-02-15 DIAGNOSIS — Z98.890 OTHER SPECIFIED POSTPROCEDURAL STATES: Chronic | ICD-10-CM

## 2024-02-15 DIAGNOSIS — Z00.8 ENCOUNTER FOR OTHER GENERAL EXAMINATION: ICD-10-CM

## 2024-02-15 DIAGNOSIS — Z90.710 ACQUIRED ABSENCE OF BOTH CERVIX AND UTERUS: Chronic | ICD-10-CM

## 2024-02-15 DIAGNOSIS — Z98.891 HISTORY OF UTERINE SCAR FROM PREVIOUS SURGERY: Chronic | ICD-10-CM

## 2024-02-15 LAB — CYTOLOGY CVX/VAG DOC THIN PREP: NORMAL

## 2024-02-15 PROCEDURE — 76830 TRANSVAGINAL US NON-OB: CPT | Mod: 26

## 2024-02-15 PROCEDURE — 76830 TRANSVAGINAL US NON-OB: CPT

## 2024-02-15 PROCEDURE — 76856 US EXAM PELVIC COMPLETE: CPT | Mod: 26

## 2024-02-15 PROCEDURE — 76856 US EXAM PELVIC COMPLETE: CPT

## 2024-03-04 ENCOUNTER — APPOINTMENT (OUTPATIENT)
Dept: OBGYN | Facility: CLINIC | Age: 44
End: 2024-03-04
Payer: MEDICAID

## 2024-03-04 DIAGNOSIS — Z71.2 PERSON CONSULTING FOR EXPLANATION OF EXAMINATION OR TEST FINDINGS: ICD-10-CM

## 2024-03-04 PROCEDURE — 99213 OFFICE O/P EST LOW 20 MIN: CPT

## 2024-03-04 NOTE — HISTORY OF PRESENT ILLNESS
[FreeTextEntry1] : Kirstin is a telehealth to review results and follow-up on incidental finding on CT. History of  partial nephrectomy with stage I renal cell carcinoma, during the procedure she was found to have right adnexal torsion and had right salpingectomy. History of stable paraovarian cyst as well as stable hydrosalpinx. Recent CT scan with suspected right ovarian cyst. Follow-up ultrasound shows a pelvis status post hysterectomy, right ovary measuring 2.3 x 1.7 x 1.4 cm, normal Doppler, left ovary was not visualized.  No gross left adnexal masses.  No free fluid.  Impression likely normal right ovary. Kirstin has no pelvic pain or other symptoms.

## 2024-03-04 NOTE — REASON FOR VISIT
[Follow-Up] : a follow-up evaluation of [Home] : at home, [unfilled] , at the time of the visit. [Patient] : the patient [Medical Office: ([facility])___] : at the medical office located in  [Self] : self [FreeTextEntry2] : review results

## 2024-03-04 NOTE — DISCUSSION/SUMMARY
[FreeTextEntry1] : I reviewed his imaging myself. I agree with assessment of right normal ovary and nonvisualized left ovary. I discussed this with Kirstin. I discussed with her that at this point there is no need for further intervention. I discussed with her routine follow-up, unless she becomes symptomatic.  Discussed symptoms such as bloating, pelvic pain, weight changes.  Discussed that if she has any of these she should be evaluated. All her questions were answered. Follow-up routinely.

## 2024-05-31 ENCOUNTER — APPOINTMENT (OUTPATIENT)
Dept: CT IMAGING | Facility: CLINIC | Age: 44
End: 2024-05-31
Payer: MEDICAID

## 2024-05-31 ENCOUNTER — OUTPATIENT (OUTPATIENT)
Dept: OUTPATIENT SERVICES | Facility: HOSPITAL | Age: 44
LOS: 1 days | End: 2024-05-31
Payer: MEDICAID

## 2024-05-31 DIAGNOSIS — Z85.528 PERSONAL HISTORY OF OTHER MALIGNANT NEOPLASM OF KIDNEY: ICD-10-CM

## 2024-05-31 DIAGNOSIS — Z90.710 ACQUIRED ABSENCE OF BOTH CERVIX AND UTERUS: Chronic | ICD-10-CM

## 2024-05-31 DIAGNOSIS — Z98.890 OTHER SPECIFIED POSTPROCEDURAL STATES: Chronic | ICD-10-CM

## 2024-05-31 DIAGNOSIS — Z98.891 HISTORY OF UTERINE SCAR FROM PREVIOUS SURGERY: Chronic | ICD-10-CM

## 2024-05-31 PROCEDURE — 74170 CT ABD WO CNTRST FLWD CNTRST: CPT | Mod: 26

## 2024-05-31 PROCEDURE — 74170 CT ABD WO CNTRST FLWD CNTRST: CPT

## 2024-06-03 NOTE — DISCHARGE NOTE NURSING/CASE MANAGEMENT/SOCIAL WORK - NSPROEXTENSIONSOFSELF_GEN_A_NUR
eyeglasses
Pt. c/o vaginal tear since last week. States it started after having sex. Advised to come to ED by GYN.

## 2024-06-12 ENCOUNTER — APPOINTMENT (OUTPATIENT)
Dept: UROLOGY | Facility: CLINIC | Age: 44
End: 2024-06-12
Payer: MEDICAID

## 2024-06-12 VITALS
BODY MASS INDEX: 26.58 KG/M2 | WEIGHT: 150 LBS | DIASTOLIC BLOOD PRESSURE: 90 MMHG | HEIGHT: 63 IN | HEART RATE: 79 BPM | SYSTOLIC BLOOD PRESSURE: 141 MMHG | OXYGEN SATURATION: 98 % | RESPIRATION RATE: 16 BRPM

## 2024-06-12 DIAGNOSIS — C64.9 MALIGNANT NEOPLASM OF UNSPECIFIED KIDNEY, EXCEPT RENAL PELVIS: ICD-10-CM

## 2024-06-12 PROCEDURE — 99213 OFFICE O/P EST LOW 20 MIN: CPT

## 2024-06-15 PROBLEM — C64.9 CLEAR CELL CARCINOMA OF KIDNEY: Status: ACTIVE | Noted: 2023-01-04

## 2024-06-15 NOTE — ASSESSMENT
[FreeTextEntry1] : Pt. doing well overall.  US renal in 6 months.  Next follow-up appointment in 6 months.  Answered all questions and addressed all concerns.  20 min discussion with kidney cancer followup, imaging review and future followup planning

## 2024-06-15 NOTE — HISTORY OF PRESENT ILLNESS
[None] : no symptoms [FreeTextEntry1] : Here for RCC follow-up. s/p right partial nephrectomy 12.15.22. 2.7 cm RCC right clear cell carcinoma.  no symptoms. pt. is doing well overall.  Recent CT abdomen 5.31.24 - negative for recurrence or metastatic disease.

## 2024-10-28 NOTE — ASSESSMENT
[FreeTextEntry1] : Right Renal mass:\par MRI likely 3cm renal cell carcinoma\par will refer to Dr. Edward for consult\par cytology\par \par pyelonephritis:\par recheck culture today DISPLAY PLAN FREE TEXT DISPLAY PLAN FREE TEXT DISPLAY PLAN FREE TEXT

## 2024-12-04 ENCOUNTER — RESULT REVIEW (OUTPATIENT)
Age: 44
End: 2024-12-04

## 2024-12-11 ENCOUNTER — APPOINTMENT (OUTPATIENT)
Dept: UROLOGY | Facility: CLINIC | Age: 44
End: 2024-12-11
Payer: MEDICAID

## 2024-12-11 VITALS
DIASTOLIC BLOOD PRESSURE: 91 MMHG | WEIGHT: 148 LBS | OXYGEN SATURATION: 100 % | HEIGHT: 63 IN | BODY MASS INDEX: 26.22 KG/M2 | HEART RATE: 58 BPM | SYSTOLIC BLOOD PRESSURE: 133 MMHG | RESPIRATION RATE: 16 BRPM

## 2024-12-11 PROCEDURE — 99213 OFFICE O/P EST LOW 20 MIN: CPT

## 2024-12-24 PROBLEM — F10.90 ALCOHOL USE: Status: INACTIVE | Noted: 2020-10-27

## 2025-05-12 ENCOUNTER — APPOINTMENT (OUTPATIENT)
Dept: OBGYN | Facility: CLINIC | Age: 45
End: 2025-05-12

## 2025-05-12 VITALS
BODY MASS INDEX: 25.87 KG/M2 | HEART RATE: 62 BPM | OXYGEN SATURATION: 99 % | HEIGHT: 63 IN | TEMPERATURE: 98.2 F | RESPIRATION RATE: 16 BRPM | WEIGHT: 146 LBS | DIASTOLIC BLOOD PRESSURE: 86 MMHG | SYSTOLIC BLOOD PRESSURE: 142 MMHG

## 2025-05-12 DIAGNOSIS — N83.201 UNSPECIFIED OVARIAN CYST, RIGHT SIDE: ICD-10-CM

## 2025-05-12 DIAGNOSIS — Z01.419 ENCOUNTER FOR GYNECOLOGICAL EXAMINATION (GENERAL) (ROUTINE) W/OUT ABNORMAL FINDINGS: ICD-10-CM

## 2025-05-12 DIAGNOSIS — Z00.00 ENCOUNTER FOR GENERAL ADULT MEDICAL EXAMINATION W/OUT ABNORMAL FINDINGS: ICD-10-CM

## 2025-05-12 PROCEDURE — 99459 PELVIC EXAMINATION: CPT

## 2025-05-12 PROCEDURE — 99396 PREV VISIT EST AGE 40-64: CPT

## 2025-05-14 LAB — HPV HIGH+LOW RISK DNA PNL CVX: NOT DETECTED

## 2025-05-20 LAB — CYTOLOGY CVX/VAG DOC THIN PREP: NORMAL

## 2025-05-23 PROBLEM — Z01.419 WELL WOMAN EXAM WITH ROUTINE GYNECOLOGICAL EXAM: Status: ACTIVE | Noted: 2023-02-01
